# Patient Record
Sex: MALE | Race: WHITE | NOT HISPANIC OR LATINO | Employment: FULL TIME | URBAN - METROPOLITAN AREA
[De-identification: names, ages, dates, MRNs, and addresses within clinical notes are randomized per-mention and may not be internally consistent; named-entity substitution may affect disease eponyms.]

---

## 2018-09-12 ENCOUNTER — OFFICE VISIT CONVERTED (OUTPATIENT)
Dept: ORTHOPEDIC SURGERY | Facility: CLINIC | Age: 53
End: 2018-09-12
Attending: ORTHOPAEDIC SURGERY

## 2018-10-10 ENCOUNTER — OFFICE VISIT CONVERTED (OUTPATIENT)
Dept: ORTHOPEDIC SURGERY | Facility: CLINIC | Age: 53
End: 2018-10-10
Attending: ORTHOPAEDIC SURGERY

## 2019-02-01 ENCOUNTER — HOSPITAL ENCOUNTER (OUTPATIENT)
Dept: LAB | Facility: HOSPITAL | Age: 54
Discharge: HOME | End: 2019-02-01
Attending: PHYSICIAN ASSISTANT

## 2019-02-01 LAB
C TRACH RRNA CVX QL NAA+PROBE: NOT DETECTED
CONV HIV-1/ HIV-2: NEGATIVE
N GONORRHOEA DNA SPEC QL NAA+PROBE: NOT DETECTED

## 2019-02-02 LAB
CONV HERPES TYPE II IGG SUPPLEMENTAL CONFIRMATION: POSITIVE
HSV2 IGG SER IA-ACNC: 1.23 INDEX (ref 0–0.9)

## 2019-02-04 LAB
CONV HEPATITIS B SURFACE AG W CONFIRMATION RE: NEGATIVE
CONV HEPATITIS COMMENT: NORMAL
CONV TREPONEMA PALLIDUM (RPR) WITH FTA-ABS, TP-PA REFLEXES: NON REACTIVE
HBV CORE AB SER DONR QL IA: NEGATIVE
HBV CORE IGM SERPL QL IA: NEGATIVE
HBV E AB SERPL QL IA: NEGATIVE
HBV E AG SERPL QL IA: NEGATIVE
HBV SURFACE AB SER QL: NON REACTIVE
HCV AB S/CO SERPL IA: <0.1 S/CO RATIO (ref 0–0.9)
HSV I/II IGM: <0.91 RATIO (ref 0–0.9)

## 2019-02-13 ENCOUNTER — HOSPITAL ENCOUNTER (OUTPATIENT)
Dept: LAB | Facility: HOSPITAL | Age: 54
Discharge: HOME | End: 2019-02-13
Attending: PHYSICIAN ASSISTANT

## 2019-02-13 LAB
25(OH)D3 SERPL-MCNC: 26.1 NG/ML (ref 30–100)
ALBUMIN SERPL-MCNC: 4.5 G/DL (ref 3.5–5)
ALBUMIN/GLOB SERPL: 1.8 {RATIO} (ref 1.4–2.6)
ALP SERPL-CCNC: 46 U/L (ref 56–119)
ALT SERPL-CCNC: 38 U/L (ref 10–40)
ANION GAP SERPL CALC-SCNC: 19 MMOL/L (ref 8–19)
AST SERPL-CCNC: 19 U/L (ref 15–50)
BASOPHILS # BLD AUTO: 0.06 10*3/UL (ref 0–0.2)
BASOPHILS NFR BLD AUTO: 0.57 % (ref 0–3)
BILIRUB SERPL-MCNC: 0.27 MG/DL (ref 0.2–1.3)
BUN SERPL-MCNC: 18 MG/DL (ref 5–25)
BUN/CREAT SERPL: 13 {RATIO} (ref 6–20)
CALCIUM SERPL-MCNC: 9.5 MG/DL (ref 8.7–10.4)
CHLORIDE SERPL-SCNC: 101 MMOL/L (ref 99–111)
CHOLEST SERPL-MCNC: 158 MG/DL (ref 107–200)
CHOLEST/HDLC SERPL: 4.2 {RATIO} (ref 3–6)
CONV CO2: 25 MMOL/L (ref 22–32)
CONV TOTAL PROTEIN: 7 G/DL (ref 6.3–8.2)
CREAT UR-MCNC: 1.34 MG/DL (ref 0.7–1.2)
EOSINOPHIL # BLD AUTO: 0.09 10*3/UL (ref 0–0.7)
EOSINOPHIL # BLD AUTO: 0.86 % (ref 0–7)
ERYTHROCYTE [DISTWIDTH] IN BLOOD BY AUTOMATED COUNT: 12.4 % (ref 11.5–14.5)
GFR SERPLBLD BASED ON 1.73 SQ M-ARVRAT: >60 ML/MIN/{1.73_M2}
GLOBULIN UR ELPH-MCNC: 2.5 G/DL (ref 2–3.5)
GLUCOSE SERPL-MCNC: 89 MG/DL (ref 70–99)
HBA1C MFR BLD: 15.4 G/DL (ref 14–18)
HCT VFR BLD AUTO: 44.9 % (ref 42–52)
HDLC SERPL-MCNC: 38 MG/DL (ref 40–60)
LDLC SERPL CALC-MCNC: 69 MG/DL (ref 70–100)
LYMPHOCYTES # BLD AUTO: 1.98 10*3/UL (ref 1–5)
MCH RBC QN AUTO: 28.9 PG (ref 27–31)
MCHC RBC AUTO-ENTMCNC: 34.2 G/DL (ref 33–37)
MCV RBC AUTO: 84.4 FL (ref 80–96)
MONOCYTES # BLD AUTO: 0.68 10*3/UL (ref 0.2–1.2)
MONOCYTES NFR BLD AUTO: 6.88 % (ref 3–10)
NEUTROPHILS # BLD AUTO: 7.14 10*3/UL (ref 2–8)
NEUTROPHILS NFR BLD AUTO: 71.8 % (ref 30–85)
NRBC BLD AUTO-RTO: 0 % (ref 0–0.01)
OSMOLALITY SERPL CALC.SUM OF ELEC: 291 MOSM/KG (ref 273–304)
PLATELET # BLD AUTO: 264 10*3/UL (ref 130–400)
PMV BLD AUTO: 8.7 FL (ref 7.4–10.4)
POTASSIUM SERPL-SCNC: 4.5 MMOL/L (ref 3.5–5.3)
RBC # BLD AUTO: 5.32 10*6/UL (ref 4.7–6.1)
SODIUM SERPL-SCNC: 140 MMOL/L (ref 135–147)
T4 FREE SERPL-MCNC: 1.3 NG/DL (ref 0.9–1.8)
TRIGL SERPL-MCNC: 254 MG/DL (ref 40–150)
TSH SERPL-ACNC: 1.67 M[IU]/L (ref 0.27–4.2)
VARIANT LYMPHS NFR BLD MANUAL: 19.9 % (ref 20–45)
VLDLC SERPL-MCNC: 51 MG/DL (ref 5–37)
WBC # BLD AUTO: 9.95 10*3/UL (ref 4.8–10.8)

## 2019-02-14 ENCOUNTER — TELEPHONE (OUTPATIENT)
Dept: INFECTIOUS DISEASES | Facility: CLINIC | Age: 54
End: 2019-02-14

## 2019-03-12 ENCOUNTER — OFFICE VISIT (OUTPATIENT)
Dept: INFECTIOUS DISEASES | Facility: CLINIC | Age: 54
End: 2019-03-12

## 2019-03-12 VITALS
HEIGHT: 76 IN | WEIGHT: 268 LBS | HEART RATE: 79 BPM | TEMPERATURE: 97.8 F | DIASTOLIC BLOOD PRESSURE: 93 MMHG | SYSTOLIC BLOOD PRESSURE: 130 MMHG | RESPIRATION RATE: 12 BRPM | BODY MASS INDEX: 32.63 KG/M2

## 2019-03-12 DIAGNOSIS — R76.8 HSV-2 SEROPOSITIVE: Primary | ICD-10-CM

## 2019-03-12 PROCEDURE — 99244 OFF/OP CNSLTJ NEW/EST MOD 40: CPT | Performed by: INTERNAL MEDICINE

## 2019-03-12 RX ORDER — VALACYCLOVIR HYDROCHLORIDE 500 MG/1
500 TABLET, FILM COATED ORAL DAILY
Qty: 30 TABLET | Refills: 5 | Status: SHIPPED | OUTPATIENT
Start: 2019-03-12 | End: 2019-12-12 | Stop reason: SDUPTHER

## 2019-03-12 NOTE — PROGRESS NOTES
"cc: New patient HSV infection    This very nice 53-year-old gentleman we are asked for evaluation opinion regarding HSV 2 serologies.    Per the patient, he reports that he was tested for a full gambit of sexually transmitted infections at his request after recent partner tested positive for HPV.  He reports that he had been  for about 17 years but lost his wife to progressive supranuclear palsy about a year and a half ago.  He has been with 2 partners since that time.  He denies any active symptoms of sexually transmitted infection such as genital ulcers or dysuria.  Has had some recurrent fever blisters over the past year and a half.  His wife did have extramarital affairs which he theorizes may have been due to impulsivity from the neurologic disease she had.  In any event, he is very concerned about potentially spreading infections to new partners.    Past medical history: Hypertension  No known drug allergies no family history of infection    Social history: He lives in [place], [place].  Originally from [place]. Works as horse farrier and as .    Review of Systems: All other reviewed and negative except as per HPI    Blood pressure 130/93, pulse 79, temperature 97.8 °F (36.6 °C), resp. rate 12, height 193 cm (76\"), weight 122 kg (268 lb).  GENERAL: Awake and alert, in no acute distress.   HEENT:  Hearing is grossly normal.   EYES:  No conjunctival injection. No lid lag.   LUNGS: normal respiratory effort.   SKIN: Warm and dry without cutaneous eruptions   PSYCHIATRIC: Appropriate mood, affect, insight, and judgment.       DIAGNOSTICS:  HSV-2 serologies were positive at his primary providers office    Assessment and Plan  1.  HSV 2+ serology:    He is asymptomatic and has never had any genital ulcers to suggest HSV general infection.  That said there are some asymptomatic cases described.  It may be that it is falsely seropositive since the HSV serologies are very poor " test and have a lot of false positives and poor positive predictive value.  He is just exceedingly concerned about transmitting infections to new partners.  We discussed natural history of HSV and HPV.  Different therapeutic options were considered and entertained.    I think we settled on having some valacyclovir available either for suppression or treatment should he develop new ulcers.  If he develops a new fever blister of encouraged him to call the clinic where we can screen it either by DFA or culture to see if he grows HSV 1 or 2 which might help allay some anxieties.  I have gone ahead and prescribed him several months of valacyclovir.    We discussed measures to prevent sexually transmitted infections including reliable use of barrier protection and if needed abstinence.    I will see him back in clinic in 6 months or sooner if necessary    Total time of encounter was greater than 60 minutes of which over 50% was spent counseling the patient on the above topics.

## 2019-07-01 ENCOUNTER — OFFICE VISIT (OUTPATIENT)
Dept: INFECTIOUS DISEASES | Facility: CLINIC | Age: 54
End: 2019-07-01

## 2019-07-01 ENCOUNTER — APPOINTMENT (OUTPATIENT)
Dept: LAB | Facility: HOSPITAL | Age: 54
End: 2019-07-01

## 2019-07-01 ENCOUNTER — TELEPHONE (OUTPATIENT)
Dept: INFECTIOUS DISEASES | Facility: CLINIC | Age: 54
End: 2019-07-01

## 2019-07-01 DIAGNOSIS — R76.8 HSV-2 SEROPOSITIVE: Primary | ICD-10-CM

## 2019-07-01 DIAGNOSIS — Z79.2 LONG TERM CURRENT USE OF ANTIBIOTICS: ICD-10-CM

## 2019-07-01 DIAGNOSIS — B00.2 HERPES GINGIVOSTOMATITIS: ICD-10-CM

## 2019-07-01 PROCEDURE — 99213 OFFICE O/P EST LOW 20 MIN: CPT | Performed by: INTERNAL MEDICINE

## 2019-07-01 PROCEDURE — 87255 GENET VIRUS ISOLATE HSV: CPT | Performed by: INTERNAL MEDICINE

## 2019-07-01 NOTE — TELEPHONE ENCOUNTER
Pt states that he was instructed by Dr. Garcia that the next time he had outbreak of fever blisters on his lip to come to the lab to have a swab.  Patient states that he currently is having an outbreak and is on his way to the lab.  I informed him that I would make Dr. Garcia aware so that an order can be placed in Epic

## 2019-07-01 NOTE — PROGRESS NOTES
cc: f/u HSV?    Patient was evaluated for potential HSV in March 2019.  He was concerned about passing this on the new sexual partner so we put him on prophylactic and suppressive valacyclovir.  He is tolerated this without side effects or missed doses.  5 days ago he noted 3 separate lesions of blistering on the lower lip.  This occurred in the context of being on the son in [place], [place].  His whole face actually blistered.  He said some crusting from the lesions which is improved with an increased dose of the valacyclovir.  He is interested to see if this might be HSV-1 versus to do so came to the clinic today    Past medical history: Hypertension  No known drug allergies no family history of infection    Social history: He lives in [place], [place].  Originally [place]. Works as horse farrier and as .      GENERAL: Awake and alert, in no acute distress.   HEENT:  Hearing is grossly normal.   EYES:  No conjunctival injection. No lid lag.   LUNGS: normal respiratory effort.   SKIN: Warm and dry without cutaneous eruptions x on lip where he has three lesion   PSYCHIATRIC: Appropriate mood, affect, insight, and judgment.       DIAGNOSTICS:  HSV-2 serologies were positive at his primary providers office    Assessment and Plan  1.  HSV 2+ serology/herpes gingivostomatitis  2.  Long-term current use antibiotics  I unroofed a lesion on the lip and this was mainly bloody.  No real purulent fluid.  I suspect that that these are healing HSV outbreak and the cultures will be negative but we will go ahead and send down for culture just to verify.  Otherwise we will keep on valacyclovir

## 2019-07-03 LAB — HSV SPEC CULT: NEGATIVE

## 2019-07-09 ENCOUNTER — TELEPHONE (OUTPATIENT)
Dept: INFECTIOUS DISEASES | Facility: CLINIC | Age: 54
End: 2019-07-09

## 2019-07-09 NOTE — TELEPHONE ENCOUNTER
After several attempts and voicemails left for patient with no response, I finally left him a voicemail stating test results from Dr. Garcia were negative and he could call our office for specific details if needed. Ninoska Pichardo RN

## 2019-07-09 NOTE — TELEPHONE ENCOUNTER
----- Message from Chin Garcia MD sent at 7/4/2019  6:03 AM EDT -----  Can we let him know his swab culture was negative?

## 2019-11-04 ENCOUNTER — OFFICE VISIT (OUTPATIENT)
Dept: INFECTIOUS DISEASES | Facility: CLINIC | Age: 54
End: 2019-11-04

## 2019-11-04 VITALS
BODY MASS INDEX: 31.9 KG/M2 | SYSTOLIC BLOOD PRESSURE: 130 MMHG | WEIGHT: 262 LBS | HEART RATE: 113 BPM | TEMPERATURE: 98 F | HEIGHT: 76 IN | OXYGEN SATURATION: 98 % | DIASTOLIC BLOOD PRESSURE: 90 MMHG

## 2019-11-04 DIAGNOSIS — B00.2 HERPES GINGIVOSTOMATITIS: Primary | ICD-10-CM

## 2019-11-04 DIAGNOSIS — Z79.2 LONG TERM CURRENT USE OF ANTIBIOTICS: ICD-10-CM

## 2019-11-04 PROCEDURE — 99213 OFFICE O/P EST LOW 20 MIN: CPT | Performed by: INTERNAL MEDICINE

## 2019-11-04 NOTE — PROGRESS NOTES
cc: f/u HSV?    Patient was evaluated for potential HSV in March 2019.  He was concerned about passing this on the new sexual partner so we put him on prophylactic and suppressive valacyclovir.  He came in for possible outbreak in July 2019 but was already several days old and culture was negative.  He reports that he may be had one more outbreak since that time which responded to twice daily dosing of the Valtrex.  He takes the long-term use of Valtrex about 90% of the time.  Otherwise he is having some nocturia and is due for colonoscopy so is going to follow-up with his primary care provider    Past medical history: Hypertension  No known drug allergies no family history of infection    Social history: He lives in [place], [place].  Originally from [place]. Works as horse farrier and as .    Vitals:    11/04/19 1334   BP: 130/90   Pulse: 113   Temp: 98 °F (36.7 °C)   SpO2: 98%       GENERAL: Awake and alert, in no acute distress.   HEENT:  Hearing is grossly normal.   EYES:  No conjunctival injection. No lid lag.   LUNGS: normal respiratory effort.   SKIN: Warm and dry without cutaneous eruptions x on lip where he has three lesion   PSYCHIATRIC: Appropriate mood, affect, insight, and judgment.       DIAGNOSTICS:  HSV-2 serologies were positive at his primary providers office  7/1/19  HSV cx: negative  Assessment and Plan  1.  HSV 2+ serology/herpes gingivostomatitis  2.  Long-term current use antibiotics    Seems to be tolerating Valtrex very nicely.  We will continue.  Give at a dose of 500 daily.  Return to clinic in 6 months or sooner if needed

## 2019-11-07 ENCOUNTER — HOSPITAL ENCOUNTER (OUTPATIENT)
Dept: LAB | Facility: HOSPITAL | Age: 54
Discharge: HOME | End: 2019-11-07
Attending: PHYSICIAN ASSISTANT

## 2019-11-07 LAB
ALBUMIN SERPL-MCNC: 4.5 G/DL (ref 3.5–5)
ALBUMIN/GLOB SERPL: 1.7 {RATIO} (ref 1.4–2.6)
ALP SERPL-CCNC: 49 U/L (ref 56–119)
ALT SERPL-CCNC: 41 U/L (ref 10–40)
ANION GAP SERPL CALC-SCNC: 23 MMOL/L (ref 8–19)
AST SERPL-CCNC: 18 U/L (ref 15–50)
BASOPHILS # BLD AUTO: 0.07 10*3/UL (ref 0–0.2)
BASOPHILS NFR BLD AUTO: 0.8 % (ref 0–3)
BILIRUB SERPL-MCNC: 0.38 MG/DL (ref 0.2–1.3)
BUN SERPL-MCNC: 20 MG/DL (ref 5–25)
BUN/CREAT SERPL: 16 {RATIO} (ref 6–20)
CALCIUM SERPL-MCNC: 9.7 MG/DL (ref 8.7–10.4)
CHLORIDE SERPL-SCNC: 99 MMOL/L (ref 99–111)
CONV ABS IMM GRAN: 0.03 10*3/UL (ref 0–0.2)
CONV CO2: 24 MMOL/L (ref 22–32)
CONV IMMATURE GRAN: 0.4 % (ref 0–1.8)
CONV TOTAL PROTEIN: 7.1 G/DL (ref 6.3–8.2)
CREAT UR-MCNC: 1.24 MG/DL (ref 0.7–1.2)
DEPRECATED RDW RBC AUTO: 39.8 FL (ref 35.1–43.9)
EOSINOPHIL # BLD AUTO: 0.1 10*3/UL (ref 0–0.7)
EOSINOPHIL # BLD AUTO: 1.2 % (ref 0–7)
ERYTHROCYTE [DISTWIDTH] IN BLOOD BY AUTOMATED COUNT: 12.8 % (ref 11.6–14.4)
GFR SERPLBLD BASED ON 1.73 SQ M-ARVRAT: >60 ML/MIN/{1.73_M2}
GLOBULIN UR ELPH-MCNC: 2.6 G/DL (ref 2–3.5)
GLUCOSE SERPL-MCNC: 106 MG/DL (ref 70–99)
HCT VFR BLD AUTO: 44.1 % (ref 42–52)
HGB BLD-MCNC: 14.6 G/DL (ref 14–18)
LYMPHOCYTES # BLD AUTO: 1.82 10*3/UL (ref 1–5)
LYMPHOCYTES NFR BLD AUTO: 21.6 % (ref 20–45)
MCH RBC QN AUTO: 28.4 PG (ref 27–31)
MCHC RBC AUTO-ENTMCNC: 33.1 G/DL (ref 33–37)
MCV RBC AUTO: 85.8 FL (ref 80–96)
MONOCYTES # BLD AUTO: 0.74 10*3/UL (ref 0.2–1.2)
MONOCYTES NFR BLD AUTO: 8.8 % (ref 3–10)
NEUTROPHILS # BLD AUTO: 5.66 10*3/UL (ref 2–8)
NEUTROPHILS NFR BLD AUTO: 67.2 % (ref 30–85)
NRBC CBCN: 0 % (ref 0–0.7)
OSMOLALITY SERPL CALC.SUM OF ELEC: 297 MOSM/KG (ref 273–304)
PLATELET # BLD AUTO: 275 10*3/UL (ref 130–400)
PMV BLD AUTO: 11 FL (ref 9.4–12.4)
POTASSIUM SERPL-SCNC: 4.2 MMOL/L (ref 3.5–5.3)
PSA SERPL-MCNC: 3.07 NG/ML (ref 0–4)
RBC # BLD AUTO: 5.14 10*6/UL (ref 4.7–6.1)
SODIUM SERPL-SCNC: 142 MMOL/L (ref 135–147)
WBC # BLD AUTO: 8.42 10*3/UL (ref 4.8–10.8)

## 2019-11-12 ENCOUNTER — HOSPITAL ENCOUNTER (OUTPATIENT)
Dept: LAB | Facility: HOSPITAL | Age: 54
Discharge: HOME | End: 2019-11-12
Attending: PHYSICIAN ASSISTANT

## 2019-11-12 LAB
APPEARANCE UR: ABNORMAL
BILIRUB UR QL: NEGATIVE
COLOR UR: YELLOW
CONV BACTERIA: NEGATIVE
CONV COLLECTION SOURCE (UA): ABNORMAL
CONV UROBILINOGEN IN URINE BY AUTOMATED TEST STRIP: 0.2 {EHRLICHU}/DL (ref 0.1–1)
GLUCOSE UR QL: NEGATIVE MG/DL
HGB UR QL STRIP: NEGATIVE
KETONES UR QL STRIP: NEGATIVE MG/DL
LEUKOCYTE ESTERASE UR QL STRIP: NEGATIVE
NITRITE UR QL STRIP: NEGATIVE
PH UR STRIP.AUTO: 5 [PH] (ref 5–8)
PROT UR QL: NEGATIVE MG/DL
RBC #/AREA URNS HPF: ABNORMAL /[HPF]
SP GR UR: 1.02 (ref 1–1.03)
WBC #/AREA URNS HPF: ABNORMAL /[HPF]

## 2019-11-14 LAB — BACTERIA UR CULT: NORMAL

## 2019-12-04 ENCOUNTER — OFFICE VISIT CONVERTED (OUTPATIENT)
Dept: SURGERY | Facility: CLINIC | Age: 54
End: 2019-12-04
Attending: SURGERY

## 2019-12-13 RX ORDER — VALACYCLOVIR HYDROCHLORIDE 500 MG/1
500 TABLET, FILM COATED ORAL DAILY
Qty: 30 TABLET | Refills: 5 | Status: SHIPPED | OUTPATIENT
Start: 2019-12-13 | End: 2020-01-12

## 2019-12-17 ENCOUNTER — HOSPITAL ENCOUNTER (OUTPATIENT)
Dept: GASTROENTEROLOGY | Facility: HOSPITAL | Age: 54
Setting detail: HOSPITAL OUTPATIENT SURGERY
Discharge: HOME | End: 2019-12-17
Attending: SURGERY

## 2020-12-04 ENCOUNTER — HOSPITAL ENCOUNTER (EMERGENCY)
Facility: HOSPITAL | Age: 55
Discharge: HOME | End: 2020-12-04
Attending: EMERGENCY MEDICINE | Admitting: EMERGENCY MEDICINE

## 2020-12-04 VITALS
WEIGHT: 270 LBS | TEMPERATURE: 97.7 F | HEART RATE: 78 BPM | BODY MASS INDEX: 32.88 KG/M2 | RESPIRATION RATE: 16 BRPM | SYSTOLIC BLOOD PRESSURE: 133 MMHG | DIASTOLIC BLOOD PRESSURE: 90 MMHG | HEIGHT: 76 IN | OXYGEN SATURATION: 94 %

## 2020-12-04 DIAGNOSIS — M25.572 ACUTE LEFT ANKLE PAIN: Primary | ICD-10-CM

## 2020-12-04 DIAGNOSIS — R73.09 ELEVATED GLUCOSE: ICD-10-CM

## 2020-12-04 DIAGNOSIS — M10.00 ACUTE IDIOPATHIC GOUT, UNSPECIFIED SITE: ICD-10-CM

## 2020-12-04 DIAGNOSIS — E79.0 ELEVATED BLOOD URIC ACID LEVEL: ICD-10-CM

## 2020-12-04 LAB
ALBUMIN SERPL-MCNC: 4 G/DL (ref 3.5–5.2)
ALBUMIN/GLOB SERPL: 1.4 G/DL
ALP SERPL-CCNC: 55 U/L (ref 39–117)
ALT SERPL W P-5'-P-CCNC: 29 U/L (ref 1–41)
ANION GAP SERPL CALCULATED.3IONS-SCNC: 8 MMOL/L (ref 5–15)
AST SERPL-CCNC: 15 U/L (ref 1–40)
BASOPHILS # BLD AUTO: 0.05 10*3/MM3 (ref 0–0.2)
BASOPHILS NFR BLD AUTO: 0.5 % (ref 0–1.5)
BILIRUB SERPL-MCNC: 0.3 MG/DL (ref 0–1.2)
BUN SERPL-MCNC: 19 MG/DL (ref 6–20)
BUN/CREAT SERPL: 15.7 (ref 7–25)
CALCIUM SPEC-SCNC: 9.6 MG/DL (ref 8.6–10.5)
CHLORIDE SERPL-SCNC: 104 MMOL/L (ref 98–107)
CO2 SERPL-SCNC: 27 MMOL/L (ref 22–29)
CREAT SERPL-MCNC: 1.21 MG/DL (ref 0.76–1.27)
DEPRECATED RDW RBC AUTO: 39.7 FL (ref 37–54)
EOSINOPHIL # BLD AUTO: 0.15 10*3/MM3 (ref 0–0.4)
EOSINOPHIL NFR BLD AUTO: 1.5 % (ref 0.3–6.2)
ERYTHROCYTE [DISTWIDTH] IN BLOOD BY AUTOMATED COUNT: 12.9 % (ref 12.3–15.4)
ERYTHROCYTE [SEDIMENTATION RATE] IN BLOOD: 16 MM/HR (ref 0–20)
GFR SERPL CREATININE-BSD FRML MDRD: 63 ML/MIN/1.73
GLOBULIN UR ELPH-MCNC: 2.8 GM/DL
GLUCOSE SERPL-MCNC: 142 MG/DL (ref 65–99)
HCT VFR BLD AUTO: 43.1 % (ref 37.5–51)
HGB BLD-MCNC: 13.8 G/DL (ref 13–17.7)
IMM GRANULOCYTES # BLD AUTO: 0.04 10*3/MM3 (ref 0–0.05)
IMM GRANULOCYTES NFR BLD AUTO: 0.4 % (ref 0–0.5)
LYMPHOCYTES # BLD AUTO: 1.63 10*3/MM3 (ref 0.7–3.1)
LYMPHOCYTES NFR BLD AUTO: 15.9 % (ref 19.6–45.3)
MCH RBC QN AUTO: 27.3 PG (ref 26.6–33)
MCHC RBC AUTO-ENTMCNC: 32 G/DL (ref 31.5–35.7)
MCV RBC AUTO: 85.3 FL (ref 79–97)
MONOCYTES # BLD AUTO: 0.83 10*3/MM3 (ref 0.1–0.9)
MONOCYTES NFR BLD AUTO: 8.1 % (ref 5–12)
NEUTROPHILS NFR BLD AUTO: 7.57 10*3/MM3 (ref 1.7–7)
NEUTROPHILS NFR BLD AUTO: 73.6 % (ref 42.7–76)
NRBC BLD AUTO-RTO: 0 /100 WBC (ref 0–0.2)
PLATELET # BLD AUTO: 340 10*3/MM3 (ref 140–450)
PMV BLD AUTO: 9.9 FL (ref 6–12)
POTASSIUM SERPL-SCNC: 4.3 MMOL/L (ref 3.5–5.2)
PROT SERPL-MCNC: 6.8 G/DL (ref 6–8.5)
RBC # BLD AUTO: 5.05 10*6/MM3 (ref 4.14–5.8)
SODIUM SERPL-SCNC: 139 MMOL/L (ref 136–145)
URATE SERPL-MCNC: 9.6 MG/DL (ref 3.4–7)
WBC # BLD AUTO: 10.27 10*3/MM3 (ref 3.4–10.8)

## 2020-12-04 PROCEDURE — 84550 ASSAY OF BLOOD/URIC ACID: CPT | Performed by: EMERGENCY MEDICINE

## 2020-12-04 PROCEDURE — 99283 EMERGENCY DEPT VISIT LOW MDM: CPT

## 2020-12-04 PROCEDURE — 85025 COMPLETE CBC W/AUTO DIFF WBC: CPT | Performed by: EMERGENCY MEDICINE

## 2020-12-04 PROCEDURE — 96375 TX/PRO/DX INJ NEW DRUG ADDON: CPT

## 2020-12-04 PROCEDURE — 80053 COMPREHEN METABOLIC PANEL: CPT | Performed by: EMERGENCY MEDICINE

## 2020-12-04 PROCEDURE — 96374 THER/PROPH/DIAG INJ IV PUSH: CPT

## 2020-12-04 PROCEDURE — 85652 RBC SED RATE AUTOMATED: CPT | Performed by: EMERGENCY MEDICINE

## 2020-12-04 PROCEDURE — 25010000002 DEXAMETHASONE PER 1 MG: Performed by: EMERGENCY MEDICINE

## 2020-12-04 PROCEDURE — 25010000002 KETOROLAC TROMETHAMINE PER 15 MG: Performed by: EMERGENCY MEDICINE

## 2020-12-04 RX ORDER — DEXAMETHASONE SODIUM PHOSPHATE 4 MG/ML
8 INJECTION, SOLUTION INTRA-ARTICULAR; INTRALESIONAL; INTRAMUSCULAR; INTRAVENOUS; SOFT TISSUE ONCE
Status: COMPLETED | OUTPATIENT
Start: 2020-12-04 | End: 2020-12-04

## 2020-12-04 RX ORDER — KETOROLAC TROMETHAMINE 15 MG/ML
15 INJECTION, SOLUTION INTRAMUSCULAR; INTRAVENOUS ONCE
Status: COMPLETED | OUTPATIENT
Start: 2020-12-04 | End: 2020-12-04

## 2020-12-04 RX ORDER — PREDNISONE 20 MG/1
20 TABLET ORAL 2 TIMES DAILY
Qty: 6 TABLET | Refills: 0 | Status: SHIPPED | OUTPATIENT
Start: 2020-12-04 | End: 2020-12-07

## 2020-12-04 RX ORDER — SODIUM CHLORIDE 0.9 % (FLUSH) 0.9 %
10 SYRINGE (ML) INJECTION AS NEEDED
Status: DISCONTINUED | OUTPATIENT
Start: 2020-12-04 | End: 2020-12-04 | Stop reason: HOSPADM

## 2020-12-04 RX ORDER — COLCHICINE 0.6 MG/1
0.6 TABLET ORAL DAILY
Status: DISCONTINUED | OUTPATIENT
Start: 2020-12-04 | End: 2020-12-04 | Stop reason: HOSPADM

## 2020-12-04 RX ORDER — COLCHICINE 0.6 MG/1
0.6 TABLET ORAL DAILY
Qty: 30 TABLET | Refills: 0 | Status: SHIPPED | OUTPATIENT
Start: 2020-12-04 | End: 2021-07-30 | Stop reason: SDUPTHER

## 2020-12-04 RX ADMIN — KETOROLAC TROMETHAMINE 15 MG: 15 INJECTION, SOLUTION INTRAMUSCULAR; INTRAVENOUS at 08:06

## 2020-12-04 RX ADMIN — DEXAMETHASONE SODIUM PHOSPHATE 8 MG: 4 INJECTION, SOLUTION INTRAMUSCULAR; INTRAVENOUS at 08:07

## 2020-12-04 RX ADMIN — COLCHICINE 0.6 MG: 0.6 TABLET, FILM COATED ORAL at 09:34

## 2020-12-04 NOTE — ED PROVIDER NOTES
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  He really does not have a primary care doctor at this point.  Presents to the ER today with what he believes is gout in his left ankle.    This gentleman has a strong family history of gout believes he started having gout about 4 years ago in his head it both his ankles and his knees and his right elbow.  A year or 2 ago he was treated at [facility] and was started on colchicine and allopurinol but then still had exacerbations and is never really followed up.    His current episode has been going on for a several days and up to 2 weeks and his left ankle is as miserable.  He has been taking over-the-counter Aleve without much relief of his symptoms.    He is never seen a rheumatologist or had a 24-hour urine done.    He denies any injury to his left ankle.  He is generally vigorously active.  He takes no regular medications.  He has been trying to modify his diet and take a variety of supplements to help prevent the gout.  He has had no bleeding per any orifice.  He is not a beer drinker.  He also reports a rash that is been present on his legs for many weeks.  He is never seen anybody for it though rash is not pruritic.        All other systems reviewed and are negative except as noted above.          Review of Systems   All other systems reviewed and are negative.      Past Medical History:   Diagnosis Date   • COPD (chronic obstructive pulmonary disease) (CMS/[facility])    • Gout        No Known Allergies    Past Surgical History:   Procedure Laterality Date   • COLONOSCOPY  12/05/2019   • LIPOMA EXCISION  2010    removed from back       History reviewed. No pertinent family history.    Social History     Socioeconomic History   • Marital status:      Spouse name: Not on file   • Number of children: Not on file   • Years of education: Not on file   • Highest education level: Not on file   Tobacco  Use   • Smoking status: Never Smoker   Substance and Sexual Activity   • Alcohol use: Never     Frequency: Never   • Drug use: Never   • Sexual activity: Defer           Objective   Physical Exam  Vitals signs and nursing note reviewed.   Constitutional:       Appearance: Normal appearance.      Comments: This is a middle-aged man in no acute distress he is alert and oriented.  He is modestly obese.   HENT:      Head: Normocephalic and atraumatic.      Right Ear: External ear normal.      Left Ear: External ear normal.      Nose: Nose normal.      Mouth/Throat:      Mouth: Mucous membranes are moist.      Pharynx: Oropharynx is clear.   Eyes:      Extraocular Movements: Extraocular movements intact.      Conjunctiva/sclera: Conjunctivae normal.      Pupils: Pupils are equal, round, and reactive to light.   Neck:      Musculoskeletal: Normal range of motion and neck supple.   Cardiovascular:      Rate and Rhythm: Normal rate and regular rhythm.      Pulses: Normal pulses.      Heart sounds: Normal heart sounds.   Pulmonary:      Effort: Pulmonary effort is normal.      Breath sounds: Normal breath sounds.   Abdominal:      General: Bowel sounds are normal. There is no distension.      Palpations: Abdomen is soft. There is no mass.      Tenderness: There is no abdominal tenderness.   Musculoskeletal:      Comments: His hand shows some osteoarthritic changes but he has no rash or evidence of gout there in his upper extremities.  His lower extremities are knees in good shape his left ankle is moderately swollen especially on the lateral aspect a little bit red and tender with slightly decreased range of movement but is not particularly hot.  Is good pulses in his feet bilaterally.  His right ankle is unremarkable.  He does have an interesting almost morbilliform but not reddened rash that extends from his knees to his ankles bilaterally.  Almost looks like molluscum contagiosum that would be a very unusual pattern.  It  spares the soles of his feet.   Lymphadenopathy:      Cervical: No cervical adenopathy.   Skin:     General: Skin is warm and dry.      Capillary Refill: Capillary refill takes less than 2 seconds.      Findings: Rash present.   Neurological:      General: No focal deficit present.      Mental Status: He is alert and oriented to person, place, and time.      Cranial Nerves: No cranial nerve deficit.      Sensory: No sensory deficit.   Psychiatric:         Mood and Affect: Mood normal.         Behavior: Behavior normal.         Thought Content: Thought content normal.         Judgment: Judgment normal.         Procedures           ED Course           Recent Results (from the past 24 hour(s))   Comprehensive Metabolic Panel    Collection Time: 12/04/20  7:41 AM    Specimen: Blood   Result Value Ref Range    Glucose 142 (H) 65 - 99 mg/dL    BUN 19 6 - 20 mg/dL    Creatinine 1.21 0.76 - 1.27 mg/dL    Sodium 139 136 - 145 mmol/L    Potassium 4.3 3.5 - 5.2 mmol/L    Chloride 104 98 - 107 mmol/L    CO2 27.0 22.0 - 29.0 mmol/L    Calcium 9.6 8.6 - 10.5 mg/dL    Total Protein 6.8 6.0 - 8.5 g/dL    Albumin 4.00 3.50 - 5.20 g/dL    ALT (SGPT) 29 1 - 41 U/L    AST (SGOT) 15 1 - 40 U/L    Alkaline Phosphatase 55 39 - 117 U/L    Total Bilirubin 0.3 0.0 - 1.2 mg/dL    eGFR Non African Amer 63 >60 mL/min/1.73    Globulin 2.8 gm/dL    A/G Ratio 1.4 g/dL    BUN/Creatinine Ratio 15.7 7.0 - 25.0    Anion Gap 8.0 5.0 - 15.0 mmol/L   Uric Acid    Collection Time: 12/04/20  7:41 AM    Specimen: Blood   Result Value Ref Range    Uric Acid 9.6 (H) 3.4 - 7.0 mg/dL   Sedimentation Rate    Collection Time: 12/04/20  7:41 AM    Specimen: Blood   Result Value Ref Range    Sed Rate 16 0 - 20 mm/hr   CBC Auto Differential    Collection Time: 12/04/20  7:41 AM    Specimen: Blood   Result Value Ref Range    WBC 10.27 3.40 - 10.80 10*3/mm3    RBC 5.05 4.14 - 5.80 10*6/mm3    Hemoglobin 13.8 13.0 - 17.7 g/dL    Hematocrit 43.1 37.5 - 51.0 %    MCV  85.3 79.0 - 97.0 fL    MCH 27.3 26.6 - 33.0 pg    MCHC 32.0 31.5 - 35.7 g/dL    RDW 12.9 12.3 - 15.4 %    RDW-SD 39.7 37.0 - 54.0 fl    MPV 9.9 6.0 - 12.0 fL    Platelets 340 140 - 450 10*3/mm3    Neutrophil % 73.6 42.7 - 76.0 %    Lymphocyte % 15.9 (L) 19.6 - 45.3 %    Monocyte % 8.1 5.0 - 12.0 %    Eosinophil % 1.5 0.3 - 6.2 %    Basophil % 0.5 0.0 - 1.5 %    Immature Grans % 0.4 0.0 - 0.5 %    Neutrophils, Absolute 7.57 (H) 1.70 - 7.00 10*3/mm3    Lymphocytes, Absolute 1.63 0.70 - 3.10 10*3/mm3    Monocytes, Absolute 0.83 0.10 - 0.90 10*3/mm3    Eosinophils, Absolute 0.15 0.00 - 0.40 10*3/mm3    Basophils, Absolute 0.05 0.00 - 0.20 10*3/mm3    Immature Grans, Absolute 0.04 0.00 - 0.05 10*3/mm3    nRBC 0.0 0.0 - 0.2 /100 WBC     Note: In addition to lab results from this visit, the labs listed above may include labs taken at another facility or during a different encounter within the last 24 hours. Please correlate lab times with ED admission and discharge times for further clarification of the services performed during this visit.    No orders to display     Vitals:    12/04/20 0741 12/04/20 0800 12/04/20 0801 12/04/20 0900   BP: 133/92 134/92  133/90   BP Location:       Patient Position:       Pulse:  79  78   Resp:  18  16   Temp:       TempSrc:       SpO2: 96% 96% 96% 94%   Weight:       Height:         Medications   sodium chloride 0.9 % flush 10 mL (has no administration in time range)   colchicine tablet 0.6 mg (0.6 mg Oral Given 12/4/20 0934)   dexamethasone (DECADRON) injection 8 mg (8 mg Intravenous Given 12/4/20 0807)   ketorolac (TORADOL) injection 15 mg (15 mg Intravenous Given 12/4/20 0806)     ECG/EMG Results (last 24 hours)     ** No results found for the last 24 hours. **        No orders to display                                     MDM  Number of Diagnoses or Management Options  Acute idiopathic gout, unspecified site:   Acute left ankle pain:   Elevated blood uric acid level:   Elevated  glucose:   Diagnosis management comments:       I reviewed all available studies at the bedside with the patient.  His labs are fairly bland save for an elevated uric acid slightly elevated glucose.    In ER he received IV steroids oral colchicine and IV Toradol and felt much better on recheck.    I think the patient has acute gouty arthritis.  He also has a chronic component of gout.  Discussed in detail about this.  I will put him on a short course of steroids I told him will probably cause the sugar to elevate a little bit of asked him to follow-up with his primary care doctor regarding that.  I will put him on a longer course of courses the needle continue his nonsteroidals we talked about diet Charo refer him to the rheumatology as I think he needs a thorough evaluation and perhaps prophylactic therapy for gout as well.    He will return to the ER if worse in any way.    All are agreeable with plan       Amount and/or Complexity of Data Reviewed  Clinical lab tests: reviewed        Final diagnoses:   Acute left ankle pain   Acute idiopathic gout, unspecified site   Elevated blood uric acid level   Elevated glucose            Brayan Aranda MD  12/04/20 3862

## 2020-12-30 ENCOUNTER — HOSPITAL ENCOUNTER (OUTPATIENT)
Dept: LAB | Facility: HOSPITAL | Age: 55
Discharge: HOME | End: 2020-12-30
Attending: PHYSICIAN ASSISTANT

## 2020-12-30 LAB
ALBUMIN SERPL-MCNC: 3.9 G/DL (ref 3.5–5)
ALBUMIN/GLOB SERPL: 1.1 {RATIO} (ref 1.4–2.6)
ALP SERPL-CCNC: 61 U/L (ref 56–119)
ALT SERPL-CCNC: 39 U/L (ref 10–40)
ANION GAP SERPL CALC-SCNC: 22 MMOL/L (ref 8–19)
AST SERPL-CCNC: 22 U/L (ref 15–50)
BASOPHILS # BLD AUTO: 0.02 10*3/UL (ref 0–0.2)
BASOPHILS NFR BLD AUTO: 0.2 % (ref 0–3)
BILIRUB SERPL-MCNC: 0.46 MG/DL (ref 0.2–1.3)
BUN SERPL-MCNC: 13 MG/DL (ref 5–25)
BUN/CREAT SERPL: 9 {RATIO} (ref 6–20)
CALCIUM SERPL-MCNC: 9.4 MG/DL (ref 8.7–10.4)
CHLORIDE SERPL-SCNC: 103 MMOL/L (ref 99–111)
CHOLEST SERPL-MCNC: 103 MG/DL (ref 107–200)
CHOLEST/HDLC SERPL: 3.1 {RATIO} (ref 3–6)
CONV ABS IMM GRAN: 0.08 10*3/UL (ref 0–0.2)
CONV CO2: 25 MMOL/L (ref 22–32)
CONV IMMATURE GRAN: 0.9 % (ref 0–1.8)
CONV TOTAL PROTEIN: 7.3 G/DL (ref 6.3–8.2)
CREAT UR-MCNC: 1.37 MG/DL (ref 0.7–1.2)
DEPRECATED RDW RBC AUTO: 39.8 FL (ref 35.1–43.9)
EOSINOPHIL # BLD AUTO: 0.09 10*3/UL (ref 0–0.7)
EOSINOPHIL # BLD AUTO: 1 % (ref 0–7)
ERYTHROCYTE [DISTWIDTH] IN BLOOD BY AUTOMATED COUNT: 12.8 % (ref 11.6–14.4)
GFR SERPLBLD BASED ON 1.73 SQ M-ARVRAT: 58 ML/MIN/{1.73_M2}
GLOBULIN UR ELPH-MCNC: 3.4 G/DL (ref 2–3.5)
GLUCOSE SERPL-MCNC: 93 MG/DL (ref 70–99)
HCT VFR BLD AUTO: 46.8 % (ref 42–52)
HDLC SERPL-MCNC: 33 MG/DL (ref 40–60)
HGB BLD-MCNC: 14.7 G/DL (ref 14–18)
LDLC SERPL CALC-MCNC: 56 MG/DL (ref 70–100)
LYMPHOCYTES # BLD AUTO: 1.43 10*3/UL (ref 1–5)
LYMPHOCYTES NFR BLD AUTO: 16.7 % (ref 20–45)
MCH RBC QN AUTO: 26.8 PG (ref 27–31)
MCHC RBC AUTO-ENTMCNC: 31.4 G/DL (ref 33–37)
MCV RBC AUTO: 85.4 FL (ref 80–96)
MONOCYTES # BLD AUTO: 0.76 10*3/UL (ref 0.2–1.2)
MONOCYTES NFR BLD AUTO: 8.9 % (ref 3–10)
NEUTROPHILS # BLD AUTO: 6.2 10*3/UL (ref 2–8)
NEUTROPHILS NFR BLD AUTO: 72.3 % (ref 30–85)
NRBC CBCN: 0 % (ref 0–0.7)
OSMOLALITY SERPL CALC.SUM OF ELEC: 300 MOSM/KG (ref 273–304)
PLATELET # BLD AUTO: 511 10*3/UL (ref 130–400)
PMV BLD AUTO: 10 FL (ref 9.4–12.4)
POTASSIUM SERPL-SCNC: 4.5 MMOL/L (ref 3.5–5.3)
PSA SERPL-MCNC: 3.39 NG/ML (ref 0–4)
RBC # BLD AUTO: 5.48 10*6/UL (ref 4.7–6.1)
SODIUM SERPL-SCNC: 145 MMOL/L (ref 135–147)
T4 FREE SERPL-MCNC: 1.3 NG/DL (ref 0.9–1.8)
TRIGL SERPL-MCNC: 68 MG/DL (ref 40–150)
TSH SERPL-ACNC: 1.6 M[IU]/L (ref 0.27–4.2)
URATE SERPL-MCNC: 9 MG/DL (ref 3.5–8.5)
VLDLC SERPL-MCNC: 14 MG/DL (ref 5–37)
WBC # BLD AUTO: 8.58 10*3/UL (ref 4.8–10.8)

## 2020-12-31 LAB — 25(OH)D3 SERPL-MCNC: 29 NG/ML (ref 30–100)

## 2021-01-15 RX ORDER — VALACYCLOVIR HYDROCHLORIDE 500 MG/1
TABLET, FILM COATED ORAL
Qty: 30 TABLET | Refills: 5 | OUTPATIENT
Start: 2021-01-15

## 2021-05-15 VITALS — RESPIRATION RATE: 14 BRPM | HEIGHT: 76 IN | WEIGHT: 279.12 LBS | BODY MASS INDEX: 33.99 KG/M2

## 2021-05-16 VITALS — HEART RATE: 52 BPM | WEIGHT: 265 LBS | BODY MASS INDEX: 32.27 KG/M2 | HEIGHT: 76 IN | OXYGEN SATURATION: 97 %

## 2021-05-16 VITALS — OXYGEN SATURATION: 98 % | HEIGHT: 76 IN | WEIGHT: 265 LBS | HEART RATE: 87 BPM | BODY MASS INDEX: 32.27 KG/M2

## 2021-07-21 ENCOUNTER — APPOINTMENT (OUTPATIENT)
Dept: GENERAL RADIOLOGY | Facility: HOSPITAL | Age: 56
End: 2021-07-21

## 2021-07-21 ENCOUNTER — HOSPITAL ENCOUNTER (EMERGENCY)
Facility: HOSPITAL | Age: 56
Discharge: HOME | End: 2021-07-21
Attending: EMERGENCY MEDICINE | Admitting: EMERGENCY MEDICINE

## 2021-07-21 VITALS
DIASTOLIC BLOOD PRESSURE: 95 MMHG | BODY MASS INDEX: 34.7 KG/M2 | HEART RATE: 117 BPM | RESPIRATION RATE: 19 BRPM | HEIGHT: 76 IN | TEMPERATURE: 98.3 F | SYSTOLIC BLOOD PRESSURE: 144 MMHG | WEIGHT: 285 LBS | OXYGEN SATURATION: 96 %

## 2021-07-21 DIAGNOSIS — S81.811A LACERATION OF RIGHT LOWER EXTREMITY, INITIAL ENCOUNTER: Primary | ICD-10-CM

## 2021-07-21 PROCEDURE — 73610 X-RAY EXAM OF ANKLE: CPT

## 2021-07-21 PROCEDURE — 99282 EMERGENCY DEPT VISIT SF MDM: CPT

## 2021-07-21 RX ORDER — LIDOCAINE HYDROCHLORIDE AND EPINEPHRINE 10; 10 MG/ML; UG/ML
20 INJECTION, SOLUTION INFILTRATION; PERINEURAL ONCE
Status: COMPLETED | OUTPATIENT
Start: 2021-07-21 | End: 2021-07-21

## 2021-07-21 RX ORDER — ALLOPURINOL 300 MG/1
300 TABLET ORAL DAILY
COMMUNITY
End: 2022-07-27 | Stop reason: SDUPTHER

## 2021-07-21 RX ORDER — TAMSULOSIN HYDROCHLORIDE 0.4 MG/1
1 CAPSULE ORAL DAILY
COMMUNITY
End: 2023-01-16 | Stop reason: SDUPTHER

## 2021-07-21 RX ORDER — GINSENG 100 MG
CAPSULE ORAL ONCE
Status: COMPLETED | OUTPATIENT
Start: 2021-07-21 | End: 2021-07-21

## 2021-07-21 RX ORDER — VALACYCLOVIR HYDROCHLORIDE 1 G/1
1000 TABLET, FILM COATED ORAL 2 TIMES DAILY PRN
COMMUNITY
End: 2021-07-30

## 2021-07-21 RX ADMIN — LIDOCAINE HYDROCHLORIDE,EPINEPHRINE BITARTRATE 8 ML: 10; .01 INJECTION, SOLUTION INFILTRATION; PERINEURAL at 17:12

## 2021-07-21 RX ADMIN — Medication 1 APPLICATION: at 17:53

## 2021-07-21 NOTE — ED PROVIDER NOTES
Subjective   Patient complaining of laceration to right anterior ankle.      History provided by:  Patient   used: No    Ankle Injury  Location:  Right anterior medial ankle  Quality:  Laceration  Severity:  Mild  Onset quality:  Sudden  Timing:  Constant  Chronicity:  New  Associated symptoms: no abdominal pain, no chest pain, no congestion, no cough, no diarrhea, no ear pain, no fever, no headaches, no nausea, no shortness of breath, no sore throat and no vomiting        Review of Systems   Constitutional: Negative for chills and fever.   HENT: Negative for congestion, ear pain and sore throat.    Eyes: Negative for pain.   Respiratory: Negative for cough, chest tightness and shortness of breath.    Cardiovascular: Negative for chest pain.   Gastrointestinal: Negative for abdominal pain, diarrhea, nausea and vomiting.   Genitourinary: Negative for flank pain and hematuria.   Musculoskeletal: Negative for joint swelling.   Skin: Negative for pallor.   Neurological: Negative for seizures and headaches.   All other systems reviewed and are negative.      Past Medical History:   Diagnosis Date   • COPD (chronic obstructive pulmonary disease) (CMS/[facility])    • Gout    • H/O cold sores    • Renal disorder        No Known Allergies    Past Surgical History:   Procedure Laterality Date   • COLONOSCOPY  12/05/2019   • LIPOMA EXCISION  2010    removed from back       History reviewed. No pertinent family history.    Social History     Socioeconomic History   • Marital status:      Spouse name: Not on file   • Number of children: Not on file   • Years of education: Not on file   • Highest education level: Not on file   Tobacco Use   • Smoking status: Never Smoker   Vaping Use   • Vaping Use: Never used   Substance and Sexual Activity   • Alcohol use: Never   • Drug use: Never   • Sexual activity: Defer           Objective   Physical Exam  Constitutional:       General: He is not in acute distress.      Appearance: Normal appearance. He is not toxic-appearing.   HENT:      Head: Normocephalic.      Mouth/Throat:      Mouth: Mucous membranes are dry.      Pharynx: Oropharynx is clear.   Eyes:      Extraocular Movements: Extraocular movements intact.      Conjunctiva/sclera: Conjunctivae normal.      Pupils: Pupils are equal, round, and reactive to light.   Cardiovascular:      Rate and Rhythm: Normal rate and regular rhythm.      Pulses: Normal pulses.   Pulmonary:      Effort: Pulmonary effort is normal. No respiratory distress.      Breath sounds: No stridor. No wheezing, rhonchi or rales.   Abdominal:      General: Abdomen is flat. Bowel sounds are normal.      Palpations: Abdomen is soft.   Musculoskeletal:         General: No swelling, tenderness or deformity. Normal range of motion.      Cervical back: Normal range of motion.      Right lower leg: No edema.      Left lower leg: No edema.        Legs:    Skin:     General: Skin is warm and dry.      Findings: No rash.   Neurological:      General: No focal deficit present.      Mental Status: He is alert. Mental status is at baseline.      Cranial Nerves: Cranial nerves are intact.      Sensory: Sensation is intact.      Motor: Motor function is intact.      Coordination: Coordination is intact.   Psychiatric:         Attention and Perception: Attention and perception normal.         Mood and Affect: Mood normal.         Speech: Speech normal.         Behavior: Behavior normal. Behavior is cooperative.         Laceration Repair    Date/Time: 7/21/2021 5:36 PM  Performed by: Yusuf Villasenor APRN  Authorized by: Bruno Yang MD     Consent:     Consent obtained:  Verbal    Consent given by:  Patient    Risks discussed:  Infection, pain, need for additional repair, poor cosmetic result, poor wound healing and tendon damage  Anesthesia (see MAR for exact dosages):     Anesthesia method:  Local infiltration    Local anesthetic:  Lidocaine 1% WITH epi  Laceration  details:     Location:  Leg    Leg location:  R lower leg    Length (cm):  6 (6)    Depth (mm):  2  Repair type:     Repair type:  Simple  Pre-procedure details:     Preparation:  Patient was prepped and draped in usual sterile fashion and imaging obtained to evaluate for foreign bodies  Exploration:     Hemostasis achieved with:  Epinephrine    Wound exploration: wound explored through full range of motion and entire depth of wound probed and visualized      Wound extent: no foreign bodies/material noted, no nerve damage noted, no tendon damage noted and no underlying fracture noted      Contaminated: no    Treatment:     Area cleansed with:  Betadine    Amount of cleaning:  Standard    Irrigation solution:  Sterile saline    Visualized foreign bodies/material removed: no    Skin repair:     Repair method:  Sutures    Suture size:  4-0    Suture material:  Nylon    Suture technique:  Simple interrupted    Number of sutures:  12  Post-procedure details:     Dressing:  Antibiotic ointment and non-adherent dressing    Patient tolerance of procedure:  Tolerated well, no immediate complications               ED Course                                           MDM  Number of Diagnoses or Management Options  Diagnosis management comments: I have spoken with the Mr. Brower. I have explained the patient´s condition, diagnoses and treatment plan based on the information available to me at this time. I have answered the PT questions and addressed any concerns. The patient has a good  understanding of the patient´s diagnosis, condition, and treatment plan as can be expected at this point. The vital signs have been stable. The patient´s condition is stable and appropriate for discharge from the emergency department.      The patient will pursue further outpatient evaluation with the primary care physician or other designated or consulting physician as outlined in the discharge instructions. They are agreeable to this plan of  care and follow-up instructions have been explained in detail. The PT has received these instructions in written format and have expressed an understanding of the discharge instructions. The patient is aware that any significant change in condition or worsening of symptoms should prompt an immediate return to this or the closest emergency department or call to 911.       Amount and/or Complexity of Data Reviewed  Tests in the radiology section of CPT®: ordered and reviewed    Risk of Complications, Morbidity, and/or Mortality  Presenting problems: low  Diagnostic procedures: low  Management options: low    Patient Progress  Patient progress: stable      Final diagnoses:   Laceration of right lower extremity, initial encounter       ED Disposition  ED Disposition     ED Disposition Condition Comment    Discharge Stable           Shannan Noriega, PA-C  [address]  [address] [address]  [phone]      For suture removal         Medication List      No changes were made to your prescriptions during this visit.          Yusuf Villasenor, APRN  07/21/21 9707

## 2021-07-30 ENCOUNTER — OFFICE VISIT (OUTPATIENT)
Dept: INTERNAL MEDICINE | Facility: CLINIC | Age: 56
End: 2021-07-30

## 2021-07-30 VITALS
SYSTOLIC BLOOD PRESSURE: 147 MMHG | TEMPERATURE: 97.3 F | BODY MASS INDEX: 34.68 KG/M2 | HEART RATE: 92 BPM | HEIGHT: 76 IN | DIASTOLIC BLOOD PRESSURE: 106 MMHG | WEIGHT: 284.8 LBS | OXYGEN SATURATION: 98 %

## 2021-07-30 DIAGNOSIS — E55.9 VITAMIN D DEFICIENCY: ICD-10-CM

## 2021-07-30 DIAGNOSIS — Z48.02 ENCOUNTER FOR REMOVAL OF SUTURES: ICD-10-CM

## 2021-07-30 DIAGNOSIS — R21 RASH AND NONSPECIFIC SKIN ERUPTION: ICD-10-CM

## 2021-07-30 DIAGNOSIS — I10 UNCONTROLLED HYPERTENSION: Primary | ICD-10-CM

## 2021-07-30 DIAGNOSIS — R97.20 ELEVATED PSA: ICD-10-CM

## 2021-07-30 DIAGNOSIS — B00.9 HSV INFECTION: ICD-10-CM

## 2021-07-30 DIAGNOSIS — K42.9 UMBILICAL HERNIA WITHOUT OBSTRUCTION AND WITHOUT GANGRENE: ICD-10-CM

## 2021-07-30 DIAGNOSIS — R73.01 IMPAIRED FASTING GLUCOSE: ICD-10-CM

## 2021-07-30 DIAGNOSIS — D17.0 LIPOMA OF HEAD: ICD-10-CM

## 2021-07-30 DIAGNOSIS — M1A.09X0 CHRONIC GOUT OF MULTIPLE SITES, UNSPECIFIED CAUSE: ICD-10-CM

## 2021-07-30 PROBLEM — M10.9 GOUT: Status: ACTIVE | Noted: 2021-07-30

## 2021-07-30 PROCEDURE — 99214 OFFICE O/P EST MOD 30 MIN: CPT | Performed by: NURSE PRACTITIONER

## 2021-07-30 RX ORDER — COLCHICINE 0.6 MG/1
0.6 TABLET ORAL DAILY
Qty: 30 TABLET | Refills: 0 | Status: SHIPPED | OUTPATIENT
Start: 2021-07-30 | End: 2022-07-27 | Stop reason: SDUPTHER

## 2021-07-30 RX ORDER — METOPROLOL SUCCINATE 25 MG/1
25 TABLET, EXTENDED RELEASE ORAL DAILY
COMMUNITY
Start: 2021-07-13 | End: 2022-07-27

## 2021-07-30 RX ORDER — VALACYCLOVIR HYDROCHLORIDE 1 G/1
1000 TABLET, FILM COATED ORAL 2 TIMES DAILY PRN
Qty: 90 TABLET | Refills: 3 | Status: CANCELLED | OUTPATIENT
Start: 2021-07-30

## 2021-07-30 RX ORDER — VALACYCLOVIR HYDROCHLORIDE 500 MG/1
500 TABLET, FILM COATED ORAL DAILY
Qty: 90 TABLET | Refills: 3 | Status: SHIPPED | OUTPATIENT
Start: 2021-07-30 | End: 2022-07-27 | Stop reason: SDUPTHER

## 2021-07-30 NOTE — PROGRESS NOTES
Chief Complaint  Suture / Staple Removal (pt may need stitches removed today, per your request. ), Abdominal Pain (pt complains of hernia/ abdominal pain. ), and skin (pt complains of scaly skin on ankles, would like dermatology referral. )    Subjective    History of Present Illness:  Alli Brower [facility] of hypertension, prediabetes, chronic gout, HSV depression therapy, and vitamin D deficiency.  [facility] and those were reviewed in detail with the patient today.  Hemoglobin A1c is 6.1.  Glucose is controlled by lifestyle modification.  He has a family history in which his father had diabetes, hypertension, heart disease, and abdominal aortic aneurysm.  He states that his rheumatologist that he was seen for gout is no longer in the area.  He takes colchicine and allopurinol to treat this.  He has also been on Valtrex for suppression therapy of HSV-1.  The patient needs refills.  The patient states that he did not take his antihypertensive today and misses doses.    The patient also was seen at the emergency department on 7/21/2021 for a right ankle injury where a horse knocked a bar into his ankle causing a laceration.  He has 12 sutures that he is requesting removal for.  The emergency department note was reviewed.    The patient is complaining of a rash to his lower extremities.  He reports that the rash is chronic, intermittent, and itchy.  He has had the rash on other areas of his body.  He has had no treatment or seeing a dermatologist.  He is requesting a referral.  He also has a lipoma to the right side of his head and it is not a new finding.  He is asking about removal of this and would like a referral.  In addition he has an umbilical hernia that he reports is painful at times and radiates into the testicle and the pain has become more frequent.    Objective   Vital Signs:   BP (!) 147/106 (BP  "Location: Left arm, Patient Position: Sitting, Cuff Size: Adult)   Pulse 92   Temp 97.3 °F (36.3 °C) (Temporal)   Ht 193 cm (76\")   Wt 129 kg (284 lb 12.8 oz)   SpO2 98%   BMI 34.67 kg/m²       Physical Exam :  Vital Signs: Blood pressure 148/96 manual cuff on left arm  General: Well nourished, well hydrated, in no acute distress  HEENT: Lipoma to right side of head; conjunctiva clear, no exudate bilateral  Skin: Flesh colored papules to lower extremites; right ankle with 12 sutures intact and mildly erythematous, no drainage or edema  Cardiovascular: S1 S2, regular rate and rhythm, no murmur  Respiratory: Clear to auscultation bilateral, no wheezes, rhonchi, or rales  Chest: Normal shape, no deformity, normal work of breathing  Abdomen: Umbilical hernia painful with palpation  Extremities: No lower extremity edema  Neurological: Alert, conversing normally  Psychological: Good eye contact, mood good, and judgment intact          Assessment and Plan:  Diagnoses and all orders for this visit:    1. Uncontrolled hypertension (Primary)  Comments:  Education on importance of taking medication and controlling BP. Patient was instructed to keep a BP log a.m. and p.m. and RTC in 2 weeks to reevaluate.    2. Impaired fasting glucose  Comments:  Education on lifestyle modification to control blood sugar.    3. Umbilical hernia without obstruction and without gangrene  -     Ambulatory Referral to General Surgery    4. Lipoma of head  -     Ambulatory Referral to Dermatology  -     Ambulatory Referral to General Surgery    5. Rash and nonspecific skin eruption  -     Ambulatory Referral to Dermatology    6. Chronic gout of multiple sites, unspecified cause  -     colchicine 0.6 MG tablet; Take 1 tablet by mouth Daily.  Dispense: 30 tablet; Refill: 0    7. HSV infection  Comments:  Education on suppression therapy of HSV infection.  Recommend weaning post 6 months treatment and treating as needed.  Orders:  -     " valACYclovir (Valtrex) 500 MG tablet; Take 1 tablet by mouth Daily.  Dispense: 90 tablet; Refill: 3    8. Elevated PSA    9. Vitamin D deficiency      Follow Up:  Patient was given instructions and counseling regarding condition. Return in about 2 weeks (around 8/13/2021) for Recheck for elevated BP.

## 2021-08-13 ENCOUNTER — HOSPITAL ENCOUNTER (OUTPATIENT)
Dept: GENERAL RADIOLOGY | Facility: HOSPITAL | Age: 56
Discharge: HOME | End: 2021-08-13

## 2021-08-13 ENCOUNTER — LAB (OUTPATIENT)
Dept: LAB | Facility: HOSPITAL | Age: 56
End: 2021-08-13

## 2021-08-13 ENCOUNTER — OFFICE VISIT (OUTPATIENT)
Dept: INTERNAL MEDICINE | Facility: CLINIC | Age: 56
End: 2021-08-13

## 2021-08-13 VITALS
WEIGHT: 280 LBS | BODY MASS INDEX: 34.1 KG/M2 | DIASTOLIC BLOOD PRESSURE: 80 MMHG | TEMPERATURE: 97.9 F | HEART RATE: 108 BPM | OXYGEN SATURATION: 97 % | HEIGHT: 76 IN | SYSTOLIC BLOOD PRESSURE: 111 MMHG

## 2021-08-13 DIAGNOSIS — I10 UNCONTROLLED HYPERTENSION: ICD-10-CM

## 2021-08-13 DIAGNOSIS — E55.9 VITAMIN D DEFICIENCY: ICD-10-CM

## 2021-08-13 DIAGNOSIS — R07.9 CHEST PAIN IN ADULT: ICD-10-CM

## 2021-08-13 DIAGNOSIS — R73.01 IMPAIRED FASTING GLUCOSE: ICD-10-CM

## 2021-08-13 DIAGNOSIS — I10 ESSENTIAL HYPERTENSION: ICD-10-CM

## 2021-08-13 DIAGNOSIS — R06.02 SHORTNESS OF BREATH ON EXERTION: ICD-10-CM

## 2021-08-13 DIAGNOSIS — R00.0 ELEVATED PULSE RATE: ICD-10-CM

## 2021-08-13 DIAGNOSIS — R07.9 CHEST PAIN IN ADULT: Primary | ICD-10-CM

## 2021-08-13 LAB
25(OH)D3 SERPL-MCNC: 60.7 NG/ML (ref 30–100)
ALBUMIN SERPL-MCNC: 4.5 G/DL (ref 3.5–5.2)
ALBUMIN/GLOB SERPL: 1.7 G/DL
ALP SERPL-CCNC: 71 U/L (ref 39–117)
ALT SERPL W P-5'-P-CCNC: 52 U/L (ref 1–41)
ANION GAP SERPL CALCULATED.3IONS-SCNC: 9.8 MMOL/L (ref 5–15)
AST SERPL-CCNC: 23 U/L (ref 1–40)
BILIRUB SERPL-MCNC: 0.4 MG/DL (ref 0–1.2)
BUN SERPL-MCNC: 16 MG/DL (ref 6–20)
BUN/CREAT SERPL: 12.1 (ref 7–25)
CALCIUM SPEC-SCNC: 9.4 MG/DL (ref 8.6–10.5)
CHLORIDE SERPL-SCNC: 102 MMOL/L (ref 98–107)
CHOLEST SERPL-MCNC: 156 MG/DL (ref 0–200)
CO2 SERPL-SCNC: 28.2 MMOL/L (ref 22–29)
CREAT SERPL-MCNC: 1.32 MG/DL (ref 0.76–1.27)
GFR SERPL CREATININE-BSD FRML MDRD: 57 ML/MIN/1.73
GLOBULIN UR ELPH-MCNC: 2.7 GM/DL
GLUCOSE SERPL-MCNC: 106 MG/DL (ref 65–99)
HBA1C MFR BLD: 5.7 % (ref 4.8–5.6)
HDLC SERPL-MCNC: 33 MG/DL (ref 40–60)
LDLC SERPL CALC-MCNC: 89 MG/DL (ref 0–100)
LDLC/HDLC SERPL: 2.53 {RATIO}
POTASSIUM SERPL-SCNC: 4.5 MMOL/L (ref 3.5–5.2)
PROT SERPL-MCNC: 7.2 G/DL (ref 6–8.5)
SODIUM SERPL-SCNC: 140 MMOL/L (ref 136–145)
TRIGL SERPL-MCNC: 198 MG/DL (ref 0–150)
TROPONIN T SERPL-MCNC: <0.01 NG/ML (ref 0–0.03)
VLDLC SERPL-MCNC: 34 MG/DL (ref 5–40)

## 2021-08-13 PROCEDURE — 82306 VITAMIN D 25 HYDROXY: CPT

## 2021-08-13 PROCEDURE — 36415 COLL VENOUS BLD VENIPUNCTURE: CPT

## 2021-08-13 PROCEDURE — 83036 HEMOGLOBIN GLYCOSYLATED A1C: CPT

## 2021-08-13 PROCEDURE — 93000 ELECTROCARDIOGRAM COMPLETE: CPT | Performed by: NURSE PRACTITIONER

## 2021-08-13 PROCEDURE — 84484 ASSAY OF TROPONIN QUANT: CPT

## 2021-08-13 PROCEDURE — 80061 LIPID PANEL: CPT

## 2021-08-13 PROCEDURE — 99214 OFFICE O/P EST MOD 30 MIN: CPT | Performed by: NURSE PRACTITIONER

## 2021-08-13 PROCEDURE — 80053 COMPREHEN METABOLIC PANEL: CPT

## 2021-08-13 PROCEDURE — 71046 X-RAY EXAM CHEST 2 VIEWS: CPT

## 2021-08-13 RX ORDER — DOXYCYCLINE HYCLATE 100 MG/1
CAPSULE ORAL
COMMUNITY
Start: 2021-08-02 | End: 2022-07-27

## 2021-08-13 RX ORDER — PREDNISONE 20 MG/1
TABLET ORAL
COMMUNITY
Start: 2021-08-03 | End: 2022-07-27 | Stop reason: SDUPTHER

## 2021-08-13 NOTE — PROGRESS NOTES
"Chief Complaint  Follow-up (2 week follow up) and Medication Problem (pt states when he takes BP medication his heart rate is all over the place. and he complains of being short of air. )    Subjective    History of Present Illness:  Alli Brower presents to [facility] for 2 week follow-up of hypertension.  He reports blood pressures have improved.  He complains that he is having heart rate fluctuation and heart palpitations.  He has a history of heart palpitations and saw cardiologist about a year ago at .  He had a heart monitor and stress test which were normal.  Heart palpitations do seem to occur with increased caffeine. He denies headache, diaphoresis, nausea, dizziness, or malaise. Today he complains of shortness of air on exertion since he had Covid illness December 2020.  He has not had the Covid vaccine.  He also complains of intermittent chest pressure with the shortness of air with exertion.  He reports intermittent wheezing and cough that is worse at night.  2 nights ago he complains of chest pain on the left side for approximately 1 to 2 minutes with no other symptoms.    Since his last visit he was seen at dermatology and had Staphylococcus epididymis cultured from his right ankle injury and he is currently taking doxycycline.      Objective   Vital Signs:   /80 (BP Location: Left arm, Patient Position: Sitting, Cuff Size: Large Adult)   Pulse 108   Temp 97.9 °F (36.6 °C) (Temporal)   Ht 193 cm (76\")   Wt 127 kg (280 lb)   SpO2 97%   BMI 34.08 kg/m²       Physical Exam :  General: Well nourished, well hydrated, in no acute distress  HEENT: Conjunctiva clear, no exudate bilateral  Skin: Right ankle laceration healing, no erythematous,  drainage or edema  Cardiovascular: S1 S2, regular rate and rhythm, no murmur  Respiratory: Clear to auscultation bilateral, no wheezes, rhonchi, or rales  Chest: Normal shape, no deformity, normal work of " breathing  Extremities: No lower extremity edema  Neurological: Alert, conversing normally  Psychological: Good eye contact, mood good, and judgment intact      ECG 12 Lead    Date/Time: 8/13/2021 12:00 PM  Performed by: Parul Aleman APRN  Authorized by: Parul Aleman APRN   Comparison: not compared with previous ECG   Previous ECG: no previous ECG available  Rhythm: sinus rhythm  Rate: normal  Conduction: conduction normal  Other: no other findings    Clinical impression: normal ECG  Comments: Consulted with cardiology, Dr. Nichols.              Assessment and Plan:  Diagnoses and all orders for this visit:    Assessment    Assessment     Problem List Items Addressed This Visit        Cardiac and Vasculature    Essential hypertension    Relevant Medications    metoprolol succinate XL (TOPROL-XL) 25 MG 24 hr tablet    Other Relevant Orders    Troponin      Other Visit Diagnoses     Chest pain in adult    -  Primary    Relevant Orders    ECG 12 Lead    Troponin    Shortness of breath on exertion        Relevant Orders    XR Chest PA & Lateral    ECG 12 Lead    Troponin    Elevated pulse rate        Relevant Orders    ECG 12 Lead    Troponin          Advised of signs and symptoms of heart attack and when to seek mediate medical attention.  Will follow up via phone for test results.    Follow Up:  Patient was given instructions and counseling regarding condition. Return if symptoms worsen or fail to improve, for Recheck 3 months.

## 2021-08-13 NOTE — PATIENT INSTRUCTIONS
Nonspecific Chest Pain, Adult  Chest pain can be caused by many different conditions. It can be caused by a condition that is life-threatening and requires treatment right away. It can also be caused by something that is not life-threatening. If you have chest pain, it can be hard to know the difference, so it is important to get help right away to make sure that you do not have a serious condition.  Some life-threatening causes of chest pain include:  · Heart attack.  · A tear in the body's main blood vessel (aortic dissection).  · Inflammation around your heart (pericarditis).  · A problem in the lungs, such as a blood clot (pulmonary embolism) or a collapsed lung (pneumothorax).  Some non life-threatening causes of chest pain include:  · Heartburn.  · Anxiety or stress.  · Damage to the bones, muscles, and cartilage that make up your chest wall.  · Pneumonia or bronchitis.  · Shingles infection (varicella-zoster virus).  Chest pain can feel like:  · Pain or discomfort on the surface of your chest or deep in your chest.  · Crushing, pressure, aching, or squeezing pain.  · Burning or tingling.  · Dull or sharp pain that is worse when you move, cough, or take a deep breath.  · Pain or discomfort that is also felt in your back, neck, jaw, shoulder, or arm, or pain that spreads to any of these areas.  Your chest pain may come and go. It may also be constant. Your health care provider will do lab tests and other studies to find the cause of your pain. Treatment will depend on the cause of your chest pain.  Follow these instructions at home:  Medicines  · Take over-the-counter and prescription medicines only as told by your health care provider.  · If you were prescribed an antibiotic, take it as told by your health care provider. Do not stop taking the antibiotic even if you start to feel better.  Lifestyle    · Rest as directed by your health care provider.  · Do not use any products that contain nicotine or tobacco,  such as cigarettes and e-cigarettes. If you need help quitting, ask your health care provider.  · Do not drink alcohol.  · Make healthy lifestyle choices as recommended. These may include:  ? Getting regular exercise. Ask your health care provider to suggest some activities that are safe for you.  ? Eating a heart-healthy diet. This includes plenty of fresh fruits and vegetables, whole grains, low-fat (lean) protein, and low-fat dairy products. A dietitian can help you find healthy eating options.  ? Maintaining a healthy weight.  ? Managing any other health conditions you have, such as high blood pressure (hypertension) or diabetes.  ? Reducing stress, such as with yoga or relaxation techniques.  General instructions  · Pay attention to any changes in your symptoms. Tell your health care provider about them or any new symptoms.  · Avoid any activities that cause chest pain.  · Keep all follow-up visits as told by your health care provider. This is important. This includes visits for any further testing if your chest pain does not go away.  Contact a health care provider if:  · Your chest pain does not go away.  · You feel depressed.  · You have a fever.  Get help right away if:  · Your chest pain gets worse.  · You have a cough that gets worse, or you cough up blood.  · You have severe pain in your abdomen.  · You faint.  · You have sudden, unexplained chest discomfort.  · You have sudden, unexplained discomfort in your arms, back, neck, or jaw.  · You have shortness of breath at any time.  · You suddenly start to sweat, or your skin gets clammy.  · You feel nausea or you vomit.  · You suddenly feel lightheaded or dizzy.  · You have severe weakness, or unexplained weakness or fatigue.  · Your heart begins to beat quickly, or it feels like it is skipping beats.  These symptoms may represent a serious problem that is an emergency. Do not wait to see if the symptoms will go away. Get medical help right away. Call your  local emergency services (911 in the [place] Do not drive yourself to the hospital.  Summary  · Chest pain can be caused by a condition that is serious and requires urgent treatment. It may also be caused by something that is not life-threatening.  · If you have chest pain, it is very important to see your health care provider. Your health care provider may do lab tests and other studies to find the cause of your pain.  · Follow your health care provider's instructions on taking medicines, making lifestyle changes, and getting emergency treatment if symptoms become worse.  · Keep all follow-up visits as told by your health care provider. This includes visits for any further testing if your chest pain does not go away.  This information is not intended to replace advice given to you by your health care provider. Make sure you discuss any questions you have with your health care provider.  Document Revised: 06/20/2019 Document Reviewed: 06/20/2019  [employer] Patient Education © 2021 [employer]

## 2021-08-16 DIAGNOSIS — R07.9 CHEST PAIN IN ADULT: ICD-10-CM

## 2021-08-16 DIAGNOSIS — R00.0 ELEVATED PULSE RATE: ICD-10-CM

## 2021-08-16 DIAGNOSIS — R06.02 SHORTNESS OF BREATH ON EXERTION: Primary | ICD-10-CM

## 2022-07-26 PROBLEM — M1A.09X0 CHRONIC GOUT OF MULTIPLE SITES: Status: ACTIVE | Noted: 2022-07-26

## 2022-07-27 ENCOUNTER — OFFICE VISIT (OUTPATIENT)
Dept: INTERNAL MEDICINE | Facility: CLINIC | Age: 57
End: 2022-07-27

## 2022-07-27 VITALS
HEIGHT: 76 IN | OXYGEN SATURATION: 95 % | SYSTOLIC BLOOD PRESSURE: 118 MMHG | DIASTOLIC BLOOD PRESSURE: 86 MMHG | TEMPERATURE: 99.3 F | HEART RATE: 87 BPM | BODY MASS INDEX: 32.81 KG/M2 | WEIGHT: 269.4 LBS

## 2022-07-27 DIAGNOSIS — M1A.09X0 CHRONIC GOUT OF MULTIPLE SITES, UNSPECIFIED CAUSE: Chronic | ICD-10-CM

## 2022-07-27 DIAGNOSIS — Z00.00 ANNUAL PHYSICAL EXAM: Primary | ICD-10-CM

## 2022-07-27 DIAGNOSIS — Z12.5 SCREENING FOR MALIGNANT NEOPLASM OF PROSTATE: ICD-10-CM

## 2022-07-27 DIAGNOSIS — Z86.19 HISTORY OF HERPES SIMPLEX INFECTION: ICD-10-CM

## 2022-07-27 DIAGNOSIS — E55.9 VITAMIN D DEFICIENCY: ICD-10-CM

## 2022-07-27 DIAGNOSIS — K42.9 UMBILICAL HERNIA WITHOUT OBSTRUCTION AND WITHOUT GANGRENE: ICD-10-CM

## 2022-07-27 DIAGNOSIS — R73.01 IMPAIRED FASTING GLUCOSE: ICD-10-CM

## 2022-07-27 PROCEDURE — 99396 PREV VISIT EST AGE 40-64: CPT | Performed by: NURSE PRACTITIONER

## 2022-07-27 RX ORDER — COLCHICINE 0.6 MG/1
0.6 TABLET ORAL DAILY
Qty: 30 TABLET | Refills: 5 | Status: SHIPPED | OUTPATIENT
Start: 2022-07-27

## 2022-07-27 RX ORDER — VALACYCLOVIR HYDROCHLORIDE 500 MG/1
500 TABLET, FILM COATED ORAL DAILY
Qty: 90 TABLET | Refills: 3 | Status: SHIPPED | OUTPATIENT
Start: 2022-07-27 | End: 2022-10-03

## 2022-07-27 RX ORDER — ALLOPURINOL 300 MG/1
300 TABLET ORAL DAILY
Qty: 90 TABLET | Refills: 3 | Status: SHIPPED | OUTPATIENT
Start: 2022-07-27

## 2022-07-27 RX ORDER — TAMSULOSIN HYDROCHLORIDE 0.4 MG/1
1 CAPSULE ORAL DAILY
Qty: 90 CAPSULE | Refills: 3 | Status: CANCELLED | OUTPATIENT
Start: 2022-07-27

## 2022-07-27 RX ORDER — PREDNISONE 20 MG/1
40 TABLET ORAL DAILY
Qty: 14 TABLET | Refills: 0 | Status: SHIPPED | OUTPATIENT
Start: 2022-07-27 | End: 2022-08-03

## 2022-07-27 RX ORDER — COLCHICINE 0.6 MG/1
0.6 TABLET ORAL DAILY
Qty: 30 TABLET | Refills: 0 | Status: CANCELLED | OUTPATIENT
Start: 2022-07-27

## 2022-07-27 RX ORDER — PREDNISONE 20 MG/1
TABLET ORAL
Status: CANCELLED | OUTPATIENT
Start: 2022-07-27

## 2022-07-27 RX ORDER — ALLOPURINOL 300 MG/1
300 TABLET ORAL DAILY
Status: CANCELLED | OUTPATIENT
Start: 2022-07-27

## 2022-07-27 NOTE — PROGRESS NOTES
"Chief Complaint  Annual Exam (Needs a psa test)  Subjective        History of Present Illness  Alli Brower presents to [facility] for:     1.  His annual physical exam.     2.  Follow-up of chronic gout, history of HSV, impaired fasting glucose and vitamin D deficiency.     Current Outpatient Medications:   •  allopurinol (ZYLOPRIM) 300 MG tablet, Take 1 tablet by mouth Daily., Disp: 90 tablet, Rfl: 3  •  colchicine 0.6 MG tablet, Take 1 tablet by mouth Daily., Disp: 30 tablet, Rfl: 5  •  predniSONE (DELTASONE) 20 MG tablet, Take 2 tablets by mouth Daily for 7 days. For gout attack, Disp: 14 tablet, Rfl: 0  •  tamsulosin (FLOMAX) 0.4 MG capsule 24 hr capsule, Take 1 capsule by mouth Daily., Disp: , Rfl:   •  valACYclovir (Valtrex) 500 MG tablet, Take 1 tablet by mouth Daily., Disp: 90 tablet, Rfl: 3  No Known Allergies   Past Medical History:   Diagnosis Date   • COPD (chronic obstructive pulmonary disease) (HCC)    • Gout    • H/O cold sores    • Renal disorder       Past Surgical History:   Procedure Laterality Date   • COLONOSCOPY  12/05/2019   • LIPOMA EXCISION  2010    removed from back      Objective   /86 (BP Location: Left arm, Patient Position: Sitting, Cuff Size: Large Adult)   Pulse 87   Temp 99.3 °F (37.4 °C) (Temporal)   Ht 193 cm (76\")   Wt 122 kg (269 lb 6.4 oz)   SpO2 95%   BMI 32.79 kg/m²    Estimated body mass index is 32.79 kg/m² as calculated from the following:    Height as of this encounter: 193 cm (76\").    Weight as of this encounter: 122 kg (269 lb 6.4 oz).   Physical Exam  Vitals reviewed.   Constitutional:       General: He is not in acute distress.     Appearance: Normal appearance.   HENT:      Head: Normocephalic and atraumatic.      Right Ear: Tympanic membrane and ear canal normal.      Left Ear: Tympanic membrane and ear canal normal.   Eyes:      Conjunctiva/sclera: Conjunctivae normal.      Pupils: Pupils are equal, round, and " reactive to light.   Cardiovascular:      Rate and Rhythm: Normal rate and regular rhythm.      Heart sounds: Normal heart sounds. No murmur heard.  Pulmonary:      Effort: Pulmonary effort is normal.      Breath sounds: Normal breath sounds. No wheezing, rhonchi or rales.   Abdominal:      General: Abdomen is flat. There is no distension.      Palpations: Abdomen is soft.      Hernia: A hernia is present. Hernia is present in the umbilical area.   Musculoskeletal:      Cervical back: Neck supple. No tenderness.      Right lower leg: No edema.      Left lower leg: No edema.   Lymphadenopathy:      Cervical: No cervical adenopathy.   Skin:     General: Skin is warm and dry.      Coloration: Skin is not jaundiced or pale.   Neurological:      General: No focal deficit present.      Mental Status: He is alert.   Psychiatric:         Mood and Affect: Mood normal.         Thought Content: Thought content normal.           Assessment and Plan    Diagnoses and all orders for this visit:    1. Annual physical exam (Primary)  Comments:  Discussed healthy diet, exercise, adequate sleep, cancer screening, immunizations, and preventative care. Annual eye exam and twice yearly dental cleaning.   Orders:  -     Comprehensive Metabolic Panel; Future  -     CBC & Differential; Future  -     Lipid Panel; Future  -     TSH; Future  -     Hemoglobin A1c; Future    2. Umbilical hernia without obstruction and without gangrene  Comments:  Worsening.  The patient would like to see Dr. Maxwell to discuss hernia repair.  Orders:  -     Ambulatory Referral to General Surgery    3. Chronic gout of multiple sites, unspecified cause  Comments:  Stable on allopurinol 300mg daily.  Scripts for colchicine and prednisone only as needed for flares.  Orders:  -     allopurinol (ZYLOPRIM) 300 MG tablet; Take 1 tablet by mouth Daily.  Dispense: 90 tablet; Refill: 3  -     colchicine 0.6 MG tablet; Take 1 tablet by mouth Daily.  Dispense: 30 tablet;  Refill: 5  -     predniSONE (DELTASONE) 20 MG tablet; Take 2 tablets by mouth Daily for 7 days. For gout attack  Dispense: 14 tablet; Refill: 0    4. History of herpes simplex infection  Comments:  The patient would like to continue suppression therapy on an as-needed basis.  Orders:  -     valACYclovir (Valtrex) 500 MG tablet; Take 1 tablet by mouth Daily.  Dispense: 90 tablet; Refill: 3    5. Impaired fasting glucose  -     Hemoglobin A1c; Future    6. Vitamin D deficiency  -     Vitamin D 25 Hydroxy; Future    7. Screening for malignant neoplasm of prostate  -     PSA Screen; Future           Follow Up     Patient was given instructions and counseling regarding his condition or for health maintenance advice. Please see specific information pulled into the AVS if appropriate.   Return in about 1 year (around 7/27/2023) for Annual physical.    [employer] [place]

## 2022-10-01 DIAGNOSIS — Z86.19 HISTORY OF HERPES SIMPLEX INFECTION: ICD-10-CM

## 2022-10-03 RX ORDER — VALACYCLOVIR HYDROCHLORIDE 500 MG/1
500 TABLET, FILM COATED ORAL DAILY
Qty: 90 TABLET | Refills: 3 | Status: SHIPPED | OUTPATIENT
Start: 2022-10-03 | End: 2023-01-16 | Stop reason: SDUPTHER

## 2022-12-29 ENCOUNTER — LAB (OUTPATIENT)
Dept: INTERNAL MEDICINE | Facility: CLINIC | Age: 57
End: 2022-12-29

## 2022-12-29 DIAGNOSIS — Z00.00 ANNUAL PHYSICAL EXAM: ICD-10-CM

## 2022-12-29 DIAGNOSIS — R73.01 IMPAIRED FASTING GLUCOSE: ICD-10-CM

## 2022-12-29 DIAGNOSIS — E55.9 VITAMIN D DEFICIENCY: ICD-10-CM

## 2022-12-29 LAB
25(OH)D3 SERPL-MCNC: 31.8 NG/ML (ref 30–100)
ALBUMIN SERPL-MCNC: 4.4 G/DL (ref 3.5–5.2)
ALBUMIN/GLOB SERPL: 1.8 G/DL
ALP SERPL-CCNC: 77 U/L (ref 39–117)
ALT SERPL W P-5'-P-CCNC: 70 U/L (ref 1–41)
ANION GAP SERPL CALCULATED.3IONS-SCNC: 11 MMOL/L (ref 5–15)
AST SERPL-CCNC: 26 U/L (ref 1–40)
BASOPHILS # BLD AUTO: 0.06 10*3/MM3 (ref 0–0.2)
BASOPHILS NFR BLD AUTO: 0.8 % (ref 0–1.5)
BILIRUB SERPL-MCNC: 0.4 MG/DL (ref 0–1.2)
BUN SERPL-MCNC: 17 MG/DL (ref 6–20)
BUN/CREAT SERPL: 12.8 (ref 7–25)
CALCIUM SPEC-SCNC: 9.5 MG/DL (ref 8.6–10.5)
CHLORIDE SERPL-SCNC: 101 MMOL/L (ref 98–107)
CHOLEST SERPL-MCNC: 148 MG/DL (ref 0–200)
CO2 SERPL-SCNC: 29 MMOL/L (ref 22–29)
CREAT SERPL-MCNC: 1.33 MG/DL (ref 0.76–1.27)
DEPRECATED RDW RBC AUTO: 40.3 FL (ref 37–54)
EGFRCR SERPLBLD CKD-EPI 2021: 63.1 ML/MIN/1.73
EOSINOPHIL # BLD AUTO: 0.08 10*3/MM3 (ref 0–0.4)
EOSINOPHIL NFR BLD AUTO: 1.1 % (ref 0.3–6.2)
ERYTHROCYTE [DISTWIDTH] IN BLOOD BY AUTOMATED COUNT: 13 % (ref 12.3–15.4)
GLOBULIN UR ELPH-MCNC: 2.5 GM/DL
GLUCOSE SERPL-MCNC: 111 MG/DL (ref 65–99)
HBA1C MFR BLD: 6.3 % (ref 4.8–5.6)
HCT VFR BLD AUTO: 45.2 % (ref 37.5–51)
HDLC SERPL-MCNC: 34 MG/DL (ref 40–60)
HGB BLD-MCNC: 14.9 G/DL (ref 13–17.7)
IMM GRANULOCYTES # BLD AUTO: 0.04 10*3/MM3 (ref 0–0.05)
IMM GRANULOCYTES NFR BLD AUTO: 0.5 % (ref 0–0.5)
LDLC SERPL CALC-MCNC: 84 MG/DL (ref 0–100)
LDLC/HDLC SERPL: 2.33 {RATIO}
LYMPHOCYTES # BLD AUTO: 1.89 10*3/MM3 (ref 0.7–3.1)
LYMPHOCYTES NFR BLD AUTO: 25 % (ref 19.6–45.3)
MCH RBC QN AUTO: 28.5 PG (ref 26.6–33)
MCHC RBC AUTO-ENTMCNC: 33 G/DL (ref 31.5–35.7)
MCV RBC AUTO: 86.4 FL (ref 79–97)
MONOCYTES # BLD AUTO: 0.62 10*3/MM3 (ref 0.1–0.9)
MONOCYTES NFR BLD AUTO: 8.2 % (ref 5–12)
NEUTROPHILS NFR BLD AUTO: 4.86 10*3/MM3 (ref 1.7–7)
NEUTROPHILS NFR BLD AUTO: 64.4 % (ref 42.7–76)
NRBC BLD AUTO-RTO: 0 /100 WBC (ref 0–0.2)
PLATELET # BLD AUTO: 263 10*3/MM3 (ref 140–450)
PMV BLD AUTO: 10.8 FL (ref 6–12)
POTASSIUM SERPL-SCNC: 4.5 MMOL/L (ref 3.5–5.2)
PROT SERPL-MCNC: 6.9 G/DL (ref 6–8.5)
RBC # BLD AUTO: 5.23 10*6/MM3 (ref 4.14–5.8)
SODIUM SERPL-SCNC: 141 MMOL/L (ref 136–145)
TRIGL SERPL-MCNC: 174 MG/DL (ref 0–150)
TSH SERPL DL<=0.05 MIU/L-ACNC: 2.61 UIU/ML (ref 0.27–4.2)
VLDLC SERPL-MCNC: 30 MG/DL (ref 5–40)
WBC NRBC COR # BLD: 7.55 10*3/MM3 (ref 3.4–10.8)

## 2022-12-29 PROCEDURE — 80050 GENERAL HEALTH PANEL: CPT | Performed by: NURSE PRACTITIONER

## 2022-12-29 PROCEDURE — 82306 VITAMIN D 25 HYDROXY: CPT | Performed by: NURSE PRACTITIONER

## 2022-12-29 PROCEDURE — 80061 LIPID PANEL: CPT | Performed by: NURSE PRACTITIONER

## 2022-12-29 PROCEDURE — 36415 COLL VENOUS BLD VENIPUNCTURE: CPT | Performed by: NURSE PRACTITIONER

## 2022-12-29 PROCEDURE — 83036 HEMOGLOBIN GLYCOSYLATED A1C: CPT | Performed by: NURSE PRACTITIONER

## 2023-01-16 ENCOUNTER — OFFICE VISIT (OUTPATIENT)
Dept: INTERNAL MEDICINE | Facility: CLINIC | Age: 58
End: 2023-01-16
Payer: COMMERCIAL

## 2023-01-16 VITALS
WEIGHT: 298 LBS | HEART RATE: 95 BPM | SYSTOLIC BLOOD PRESSURE: 132 MMHG | TEMPERATURE: 97.2 F | DIASTOLIC BLOOD PRESSURE: 80 MMHG | OXYGEN SATURATION: 99 % | HEIGHT: 76 IN | BODY MASS INDEX: 36.29 KG/M2

## 2023-01-16 DIAGNOSIS — I10 ESSENTIAL HYPERTENSION: ICD-10-CM

## 2023-01-16 DIAGNOSIS — Z00.00 ANNUAL PHYSICAL EXAM: ICD-10-CM

## 2023-01-16 DIAGNOSIS — Z12.5 SCREENING FOR MALIGNANT NEOPLASM OF PROSTATE: ICD-10-CM

## 2023-01-16 DIAGNOSIS — R39.12 WEAK URINE STREAM: ICD-10-CM

## 2023-01-16 DIAGNOSIS — R73.03 PREDIABETES: Primary | ICD-10-CM

## 2023-01-16 DIAGNOSIS — E55.9 VITAMIN D DEFICIENCY: ICD-10-CM

## 2023-01-16 DIAGNOSIS — Z82.49 FAMILY HISTORY OF ABDOMINAL AORTIC ANEURYSM (AAA): ICD-10-CM

## 2023-01-16 DIAGNOSIS — D17.0 LIPOMA OF HEAD: ICD-10-CM

## 2023-01-16 DIAGNOSIS — K42.9 UMBILICAL HERNIA WITHOUT OBSTRUCTION AND WITHOUT GANGRENE: ICD-10-CM

## 2023-01-16 DIAGNOSIS — Z86.19 HISTORY OF HERPES SIMPLEX INFECTION: ICD-10-CM

## 2023-01-16 DIAGNOSIS — E66.9 OBESITY (BMI 35.0-39.9 WITHOUT COMORBIDITY): ICD-10-CM

## 2023-01-16 PROCEDURE — 99214 OFFICE O/P EST MOD 30 MIN: CPT | Performed by: NURSE PRACTITIONER

## 2023-01-16 RX ORDER — VALACYCLOVIR HYDROCHLORIDE 500 MG/1
500 TABLET, FILM COATED ORAL DAILY
Qty: 90 TABLET | Refills: 3 | Status: SHIPPED | OUTPATIENT
Start: 2023-01-16 | End: 2023-01-16

## 2023-01-16 RX ORDER — TAMSULOSIN HYDROCHLORIDE 0.4 MG/1
1 CAPSULE ORAL DAILY
Qty: 90 CAPSULE | OUTPATIENT
Start: 2023-01-16

## 2023-01-16 RX ORDER — TAMSULOSIN HYDROCHLORIDE 0.4 MG/1
1 CAPSULE ORAL DAILY
Qty: 30 CAPSULE | Refills: 1 | Status: SHIPPED | OUTPATIENT
Start: 2023-01-16 | End: 2023-04-04 | Stop reason: SDUPTHER

## 2023-01-16 RX ORDER — SEMAGLUTIDE 0.25 MG/.5ML
0.25 INJECTION, SOLUTION SUBCUTANEOUS WEEKLY
Qty: 0.5 ML | Refills: 5 | Status: SHIPPED | OUTPATIENT
Start: 2023-01-16 | End: 2023-01-16

## 2023-01-16 RX ORDER — SEMAGLUTIDE 0.25 MG/.5ML
0.25 INJECTION, SOLUTION SUBCUTANEOUS WEEKLY
Qty: 0.5 ML | Refills: 5 | Status: SHIPPED | OUTPATIENT
Start: 2023-01-16 | End: 2023-02-07

## 2023-01-16 RX ORDER — VALACYCLOVIR HYDROCHLORIDE 500 MG/1
500 TABLET, FILM COATED ORAL DAILY
Qty: 90 TABLET | Refills: 3 | Status: SHIPPED | OUTPATIENT
Start: 2023-01-16

## 2023-01-16 RX ORDER — TAMSULOSIN HYDROCHLORIDE 0.4 MG/1
1 CAPSULE ORAL DAILY
Qty: 30 CAPSULE | Refills: 1 | Status: SHIPPED | OUTPATIENT
Start: 2023-01-16 | End: 2023-01-16

## 2023-01-16 NOTE — PROGRESS NOTES
Chief Complaint  Other (Needs a dermatology referral, he is concerned with his prostate he states when he urinates theres not a lot of pressure, he states he has more frequency. He states he may need a test done for AAA. )  Subjective    History of Present Illness  Alli Brower is a 57 y.o. male with hypertension, chronic gout, history of HSV, impaired fasting glucose and vitamin D deficency presents to [facility] for follow-up. The patient is not having any medication side effects.  He had normal heart monitor and stress test.  Negative for chest pain, dizziness, headaches and fatigue.  History of heart palpitations related to increased caffeine use.  He continues to have shortness of air since December 2020 with COVID illness.  He is complaining of weak urine stream, pressure with urination and frequency. Recent labs reviewed.    Past Medical History:   Diagnosis Date   • COPD (chronic obstructive pulmonary disease) (HCC)    • Gout    • H/O cold sores    • Renal disorder         Past Surgical History:   Procedure Laterality Date   • COLONOSCOPY  12/05/2019   • LIPOMA EXCISION  2010    removed from back        No Known Allergies       Current Outpatient Medications:   •  allopurinol (ZYLOPRIM) 300 MG tablet, Take 1 tablet by mouth Daily., Disp: 90 tablet, Rfl: 3  •  colchicine 0.6 MG tablet, Take 1 tablet by mouth Daily., Disp: 30 tablet, Rfl: 5  •  Semaglutide-Weight Management (Wegovy) 0.25 MG/0.5ML solution auto-injector, Inject 0.25 mg under the skin into the appropriate area as directed 1 (One) Time Per Week. After 4 weeks increase to 0.5 mg weekly., Disp: 0.5 mL, Rfl: 5  •  tamsulosin (FLOMAX) 0.4 MG capsule 24 hr capsule, Take 1 capsule by mouth Daily., Disp: 30 capsule, Rfl: 1  •  valACYclovir (VALTREX) 500 MG tablet, Take 1 tablet by mouth Daily., Disp: 90 tablet, Rfl: 3    Objective   /80 (BP Location: Right arm, Patient Position: Sitting, Cuff Size: Large Adult)  "  Pulse 95   Temp 97.2 °F (36.2 °C) (Temporal)   Ht 193 cm (76\")   Wt 135 kg (298 lb)   SpO2 99%   BMI 36.27 kg/m²    Estimated body mass index is 36.27 kg/m² as calculated from the following:    Height as of this encounter: 193 cm (76\").    Weight as of this encounter: 135 kg (298 lb).   Physical Exam  Vitals reviewed.   Constitutional:       General: He is not in acute distress.  HENT:      Head: Normocephalic and atraumatic.   Neck:      Comments: No thyroid enlargement  Cardiovascular:      Rate and Rhythm: Normal rate and regular rhythm.   Pulmonary:      Effort: Pulmonary effort is normal.      Breath sounds: Normal breath sounds. No wheezing, rhonchi or rales.   Musculoskeletal:      Right lower leg: No edema.      Left lower leg: No edema.   Lymphadenopathy:      Cervical: No cervical adenopathy.   Skin:     General: Skin is warm and dry.      Comments: Lipoma to right lateral scalp.   Neurological:      General: No focal deficit present.      Mental Status: He is alert.   Psychiatric:         Thought Content: Thought content normal.        Result Review :  The following data was reviewed by: APPLE Madrid on 01/16/2023:  CMP    CMP 12/29/22   Glucose 111 (A)   BUN 17   Creatinine 1.33 (A)   eGFR 63.1   Sodium 141   Potassium 4.5   Chloride 101   Calcium 9.5   Total Protein 6.9   Albumin 4.4   Globulin 2.5   Total Bilirubin 0.4   Alkaline Phosphatase 77   AST (SGOT) 26   ALT (SGPT) 70 (A)   Albumin/Globulin Ratio 1.8   BUN/Creatinine Ratio 12.8   Anion Gap 11.0   (A) Abnormal value       Comments are available for some flowsheets but are not being displayed.           CBC w/diff    CBC w/Diff 12/29/22   WBC 7.55   RBC 5.23   Hemoglobin 14.9   Hematocrit 45.2   MCV 86.4   MCH 28.5   MCHC 33.0   RDW 13.0   Platelets 263   Neutrophil Rel % 64.4   Immature Granulocyte Rel % 0.5   Lymphocyte Rel % 25.0   Monocyte Rel % 8.2   Eosinophil Rel % 1.1   Basophil Rel % 0.8           Lipid Panel    Lipid " Panel 12/29/22   Total Cholesterol 148   Triglycerides 174 (A)   HDL Cholesterol 34 (A)   VLDL Cholesterol 30   LDL Cholesterol  84   LDL/HDL Ratio 2.33   (A) Abnormal value            TSH    TSH 12/29/22   TSH 2.610           A1C Last 3 Results    HGBA1C Last 3 Results 12/29/22   Hemoglobin A1C 6.30 (A)   (A) Abnormal value                         Assessment and Plan   Diagnoses and all orders for this visit:    1. Prediabetes (Primary)  Comments:  HA1C 6.30 and worsening.  Discussed lifestyle modifications and treatment plan.  Orders:  -     Hemoglobin A1c; Future    2. Obesity (BMI 35.0-39.9 without comorbidity)  Comments:  Start Wegovy.  Discussed diet and exercise.     3. Essential hypertension  Comments:  Blood pressure well controlled without medication.    4. History of herpes simplex infection  Comments:  Stable on medication and to continue suppression therapy on an as-needed basis.    5. Vitamin D deficiency  Comments:  Will check level next visit.  Orders:  -     Vitamin D,25-Hydroxy; Future    6. Lipoma of head  -     Ambulatory Referral to General Surgery    7. Umbilical hernia without obstruction and without gangrene  -     Ambulatory Referral to General Surgery    8. Weak urine stream  -     Ambulatory Referral to Urology    9. Screening for malignant neoplasm of prostate  -     PSA Screen; Future    10. Family history of abdominal aortic aneurysm (AAA)  -     US AAA Screen Limited; Future    11. Annual physical exam  Comments:  Wellness labs ordered.  Orders:  -     Comprehensive Metabolic Panel; Future  -     CBC & Differential; Future  -     Lipid Panel; Future  -     T4, Free; Future  -     TSH; Future    Other orders  -     Discontinue: tamsulosin (FLOMAX) 0.4 MG capsule 24 hr capsule; Take 1 capsule by mouth Daily.  Dispense: 30 capsule; Refill: 1  -     Discontinue: valACYclovir (VALTREX) 500 MG tablet; Take 1 tablet by mouth Daily.  Dispense: 90 tablet; Refill: 3  -     Discontinue:  Semaglutide-Weight Management (Wegovy) 0.25 MG/0.5ML solution auto-injector; Inject 0.25 mg under the skin into the appropriate area as directed 1 (One) Time Per Week. After 4 weeks increase to 0.5 mg weekly.  Dispense: 0.5 mL; Refill: 5  -     valACYclovir (VALTREX) 500 MG tablet; Take 1 tablet by mouth Daily.  Dispense: 90 tablet; Refill: 3  -     tamsulosin (FLOMAX) 0.4 MG capsule 24 hr capsule; Take 1 capsule by mouth Daily.  Dispense: 30 capsule; Refill: 1  -     Semaglutide-Weight Management (Wegovy) 0.25 MG/0.5ML solution auto-injector; Inject 0.25 mg under the skin into the appropriate area as directed 1 (One) Time Per Week. After 4 weeks increase to 0.5 mg weekly.  Dispense: 0.5 mL; Refill: 5       Patient was given instructions and counseling regarding his condition or for health maintenance advice. Please see specific information pulled into the AVS if appropriate.   Follow Up   Return in about 6 months (around 7/16/2023) for Annual physical.    APP[facility] [place]

## 2023-01-16 NOTE — PATIENT INSTRUCTIONS
Calorie Counting for Weight Loss  Calories are units of energy. Your body needs a certain number of calories from food to keep going throughout the day. When you eat or drink more calories than your body needs, your body stores the extra calories mostly as fat. When you eat or drink fewer calories than your body needs, your body burns fat to get the energy it needs.  Calorie counting means keeping track of how many calories you eat and drink each day. Calorie counting can be helpful if you need to lose weight. If you eat fewer calories than your body needs, you should lose weight. Ask your health care provider what a healthy weight is for you.  For calorie counting to work, you will need to eat the right number of calories each day to lose a healthy amount of weight per week. A dietitian can help you figure out how many calories you need in a day and will suggest ways to reach your calorie goal.  A healthy amount of weight to lose each week is usually 1-2 lb (0.5-0.9 kg). This usually means that your daily calorie intake should be reduced by 500-750 calories.  Eating 1,200-1,500 calories a day can help most women lose weight.  Eating 1,500-1,800 calories a day can help most men lose weight.  What do I need to know about calorie counting?  Work with your health care provider or dietitian to determine how many calories you should get each day. To meet your daily calorie goal, you will need to:  Find out how many calories are in each food that you would like to eat. Try to do this before you eat.  Decide how much of the food you plan to eat.  Keep a food log. Do this by writing down what you ate and how many calories it had.  To successfully lose weight, it is important to balance calorie counting with a healthy lifestyle that includes regular activity.  Where do I find calorie information?  The number of calories in a food can be found on a Nutrition Facts label. If a food does not have a Nutrition Facts label, try to  look up the calories online or ask your dietitian for help.  Remember that calories are listed per serving. If you choose to have more than one serving of a food, you will have to multiply the calories per serving by the number of servings you plan to eat. For example, the label on a package of bread might say that a serving size is 1 slice and that there are 90 calories in a serving. If you eat 1 slice, you will have eaten 90 calories. If you eat 2 slices, you will have eaten 180 calories.  How do I keep a food log?  After each time that you eat, record the following in your food log as soon as possible:  What you ate. Be sure to include toppings, sauces, and other extras on the food.  How much you ate. This can be measured in cups, ounces, or number of items.  How many calories were in each food and drink.  The total number of calories in the food you ate.  Keep your food log near you, such as in a pocket-sized notebook or on an tiffanie or website on your mobile phone. Some programs will calculate calories for you and show you how many calories you have left to meet your daily goal.  What are some portion-control tips?  Know how many calories are in a serving. This will help you know how many servings you can have of a certain food.  Use a measuring cup to measure serving sizes. You could also try weighing out portions on a kitchen scale. With time, you will be able to estimate serving sizes for some foods.  Take time to put servings of different foods on your favorite plates or in your favorite bowls and cups so you know what a serving looks like.  Try not to eat straight from a food's packaging, such as from a bag or box. Eating straight from the package makes it hard to see how much you are eating and can lead to overeating. Put the amount you would like to eat in a cup or on a plate to make sure you are eating the right portion.  Use smaller plates, glasses, and bowls for smaller portions and to prevent  overeating.  Try not to multitask. For example, avoid watching TV or using your computer while eating. If it is time to eat, sit down at a table and enjoy your food. This will help you recognize when you are full. It will also help you be more mindful of what and how much you are eating.  What are tips for following this plan?  Reading food labels  Check the calorie count compared with the serving size. The serving size may be smaller than what you are used to eating.  Check the source of the calories. Try to choose foods that are high in protein, fiber, and vitamins, and low in saturated fat, trans fat, and sodium.  Shopping  Read nutrition labels while you shop. This will help you make healthy decisions about which foods to buy.  Pay attention to nutrition labels for low-fat or fat-free foods. These foods sometimes have the same number of calories or more calories than the full-fat versions. They also often have added sugar, starch, or salt to make up for flavor that was removed with the fat.  Make a grocery list of lower-calorie foods and stick to it.  Cooking  Try to cook your favorite foods in a healthier way. For example, try baking instead of frying.  Use low-fat dairy products.  Meal planning  Use more fruits and vegetables. One-half of your plate should be fruits and vegetables.  Include lean proteins, such as chicken, turkey, and fish.  Lifestyle  Each week, aim to do one of the followin minutes of moderate exercise, such as walking.  75 minutes of vigorous exercise, such as running.  General information  Know how many calories are in the foods you eat most often. This will help you calculate calorie counts faster.  Find a way of tracking calories that works for you. Get creative. Try different apps or programs if writing down calories does not work for you.  What foods should I eat?    Eat nutritious foods. It is better to have a nutritious, high-calorie food, such as an avocado, than a food with  few nutrients, such as a bag of potato chips.  Use your calories on foods and drinks that will fill you up and will not leave you hungry soon after eating.  Examples of foods that fill you up are nuts and nut butters, vegetables, lean proteins, and high-fiber foods such as whole grains. High-fiber foods are foods with more than 5 g of fiber per serving.  Pay attention to calories in drinks. Low-calorie drinks include water and unsweetened drinks.  The items listed above may not be a complete list of foods and beverages you can eat. Contact a dietitian for more information.  What foods should I limit?  Limit foods or drinks that are not good sources of vitamins, minerals, or protein or that are high in unhealthy fats. These include:  Candy.  Other sweets.  Sodas, specialty coffee drinks, alcohol, and juice.  The items listed above may not be a complete list of foods and beverages you should avoid. Contact a dietitian for more information.  How do I count calories when eating out?  Pay attention to portions. Often, portions are much larger when eating out. Try these tips to keep portions smaller:  Consider sharing a meal instead of getting your own.  If you get your own meal, eat only half of it. Before you start eating, ask for a container and put half of your meal into it.  When available, consider ordering smaller portions from the menu instead of full portions.  Pay attention to your food and drink choices. Knowing the way food is cooked and what is included with the meal can help you eat fewer calories.  If calories are listed on the menu, choose the lower-calorie options.  Choose dishes that include vegetables, fruits, whole grains, low-fat dairy products, and lean proteins.  Choose items that are boiled, broiled, grilled, or steamed. Avoid items that are buttered, battered, fried, or served with cream sauce. Items labeled as crispy are usually fried, unless stated otherwise.  Choose water, low-fat milk,  unsweetened iced tea, or other drinks without added sugar. If you want an alcoholic beverage, choose a lower-calorie option, such as a glass of wine or light beer.  Ask for dressings, sauces, and syrups on the side. These are usually high in calories, so you should limit the amount you eat.  If you want a salad, choose a garden salad and ask for grilled meats. Avoid extra toppings such as lofton, cheese, or fried items. Ask for the dressing on the side, or ask for olive oil and vinegar or lemon to use as dressing.  Estimate how many servings of a food you are given. Knowing serving sizes will help you be aware of how much food you are eating at restaurants.  Where to find more information  Centers for Disease Control and Prevention: www.cdc.gov  [place] Department of Agriculture: myplate.gov  Summary  Calorie counting means keeping track of how many calories you eat and drink each day. If you eat fewer calories than your body needs, you should lose weight.  A healthy amount of weight to lose per week is usually 1-2 lb (0.5-0.9 kg). This usually means reducing your daily calorie intake by 500-750 calories.  The number of calories in a food can be found on a Nutrition Facts label. If a food does not have a Nutrition Facts label, try to look up the calories online or ask your dietitian for help.  Use smaller plates, glasses, and bowls for smaller portions and to prevent overeating.  Use your calories on foods and drinks that will fill you up and not leave you hungry shortly after a meal.  This information is not intended to replace advice given to you by your health care provider. Make sure you discuss any questions you have with your health care provider.  Document Revised: 01/28/2021 Document Reviewed: 01/28/2021  Elsevier Patient Education © 2022 [employer]

## 2023-02-03 ENCOUNTER — LAB (OUTPATIENT)
Dept: INTERNAL MEDICINE | Facility: CLINIC | Age: 58
End: 2023-02-03
Payer: COMMERCIAL

## 2023-02-03 DIAGNOSIS — Z12.5 SCREENING FOR MALIGNANT NEOPLASM OF PROSTATE: ICD-10-CM

## 2023-02-03 LAB — PSA SERPL-MCNC: 3.5 NG/ML (ref 0–4)

## 2023-02-03 PROCEDURE — G0103 PSA SCREENING: HCPCS | Performed by: NURSE PRACTITIONER

## 2023-02-03 PROCEDURE — 36415 COLL VENOUS BLD VENIPUNCTURE: CPT | Performed by: NURSE PRACTITIONER

## 2023-02-07 ENCOUNTER — TELEPHONE (OUTPATIENT)
Dept: INTERNAL MEDICINE | Facility: CLINIC | Age: 58
End: 2023-02-07

## 2023-02-07 RX ORDER — SEMAGLUTIDE 0.5 MG/.5ML
0.5 INJECTION, SOLUTION SUBCUTANEOUS WEEKLY
Qty: 0.5 ML | Refills: 5 | Status: SHIPPED | OUTPATIENT
Start: 2023-02-07 | End: 2023-03-13

## 2023-02-07 NOTE — TELEPHONE ENCOUNTER
Patient tried to refill wegovy and the pharmacy stated he would need a new prescription sent in. I am sending in the 0.5mg

## 2023-02-07 NOTE — TELEPHONE ENCOUNTER
Caller: Alli Brower    Relationship: Self    Best call back number: [phone]    What medication are you requesting: INCREASED WEGOVY MEDICATION DUE TO HEIGHT AND WEIGHT    Have you had these symptoms before:    [x] Yes  [] No    Have you been treated for these symptoms before:   [x] Yes  [] No    If a prescription is needed, what is your preferred pharmacy and phone number: [id number] - [place] - [address] [facility] [phone] [facility] [phone] FX     Additional notes:  PATIENT IS REQUESTING CALL WHEN COMPLETED.

## 2023-02-13 ENCOUNTER — OFFICE VISIT (OUTPATIENT)
Dept: SURGERY | Facility: CLINIC | Age: 58
End: 2023-02-13
Payer: COMMERCIAL

## 2023-02-13 VITALS — BODY MASS INDEX: 36.29 KG/M2 | RESPIRATION RATE: 16 BRPM | HEIGHT: 76 IN | WEIGHT: 298 LBS

## 2023-02-13 DIAGNOSIS — R22.0 SCALP MASS: Primary | ICD-10-CM

## 2023-02-13 DIAGNOSIS — K42.9 UMBILICAL HERNIA WITHOUT OBSTRUCTION AND WITHOUT GANGRENE: ICD-10-CM

## 2023-02-13 PROCEDURE — 99204 OFFICE O/P NEW MOD 45 MIN: CPT | Performed by: SURGERY

## 2023-02-13 NOTE — PROGRESS NOTES
Chief Complaint:  Hernia    Primary Care Provider: Parul Aleman APRN    Referring Provider: Parul Aleman APRN    History of Present Illness  Alli Brower is a 57 y.o. male referred by [facility] for 2 issues.  One is a scalp mass.  The mass is located at the patient's right occipital area.  It has been present for a few years and patient has pain when there is pressure placed over the area.  He would like to have it removed.  Additionally, the patient has a small bulge at his umbilicus.  The bulge has been present for at least a year.  No prior abdominal surgeries.  Patient occasionally has pain at his umbilicus.  He says he has gained about 30 pounds over the past 6 months and plans to go on a diet and lose anywhere from 30 to 50 pounds.    Allergies: Patient has no known allergies.    Outpatient Medications Marked as Taking for the 2/13/23 encounter (Office Visit) with Meño Maxwell MD   Medication Sig Dispense Refill   • allopurinol (ZYLOPRIM) 300 MG tablet Take 1 tablet by mouth Daily. 90 tablet 3   • colchicine 0.6 MG tablet Take 1 tablet by mouth Daily. 30 tablet 5   • Semaglutide-Weight Management (Wegovy) 0.5 MG/0.5ML solution auto-injector Inject 0.5 mL under the skin into the appropriate area as directed 1 (One) Time Per Week. 0.5 mL 5   • tamsulosin (FLOMAX) 0.4 MG capsule 24 hr capsule Take 1 capsule by mouth Daily. 30 capsule 1   • valACYclovir (VALTREX) 500 MG tablet Take 1 tablet by mouth Daily. 90 tablet 3       Past Medical History:   • COPD (chronic obstructive pulmonary disease) (HCC)   • Gout   • H/O cold sores   • Renal disorder        Past Surgical History:   • COLONOSCOPY   • LIPOMA EXCISION    removed from back       Family History: History reviewed. No pertinent family history.     Social History:  Social History     Tobacco Use   • Smoking status: Never   • Smokeless tobacco: Never   Substance Use Topics   • Alcohol use: Never       Objective     Vital Signs:  Resp  "16   Ht 193 cm (76\")   Wt 135 kg (298 lb)   BMI 36.27 kg/m²   • Constitutional: wife present, alert, no acute distress, reliable historian  • HENT:  NCAT, no visible deformities or lesions, approximately 3 to 4 cm diameter well circumscribed, soft, mobile, nontender subcutaneous scalp mass at the right lower posterior scalp  • Eyes:  sclerae clear, conjunctivae clear, EOMI  • Neck:  normal appearance, no masses, trachea midline  • Respiratory:  breathing not labored, respiratory effort appears normal  • Cardiovascular:  heart regular rate  • Abdomen:  soft, nontender, nondistended.  Small soft bulge at umbilicus.  Skin overlying bulge is normal appearing.  Did not attempt to reduce bulge.  • Skin and subcutaneous tissue:  Warm & dry, no jaundice, see above head section  • Musculoskeletal: moving all extremities symmetrically and purposefully  • Neurologic:  no obvious motor or sensory deficits, normal gait, able to stand without difficulty, cerebellar function without any obvious abnormalities, alert & oriented x 3, speech clear  • Psychiatric:  judgment and insight intact, mood normal, affect appropriate, cooperative      Assessment:  1. Scalp mass  2. Umbilical hernia    Plan:  Will excise the scalp mass as an in-office procedure on February 24.  Plan umbilical hernia repair but repair will be deferred until sometime after patient loses weight (see above HPI section).    Discussion: Indications, options, risks, benefits, and expected outcomes of planned procedure were discussed with the patient and he agrees to proceed.    Meño Maxwell MD  02/13/2023    Electronically signed by Meño Maxwell MD, 02/13/23, 2:42 PM EST.      "

## 2023-02-24 ENCOUNTER — OFFICE VISIT (OUTPATIENT)
Dept: SURGERY | Facility: CLINIC | Age: 58
End: 2023-02-24
Payer: COMMERCIAL

## 2023-02-24 VITALS — HEIGHT: 76 IN | WEIGHT: 297 LBS | BODY MASS INDEX: 36.17 KG/M2 | RESPIRATION RATE: 16 BRPM

## 2023-02-24 DIAGNOSIS — D17.0 LIPOMA OF SCALP: Primary | ICD-10-CM

## 2023-02-24 PROCEDURE — 21011 EXC FACE LES SC <2 CM: CPT | Performed by: SURGERY

## 2023-02-24 RX ORDER — SEMAGLUTIDE 0.25 MG/.5ML
INJECTION, SOLUTION SUBCUTANEOUS
COMMUNITY
Start: 2023-02-13 | End: 2023-03-13

## 2023-02-24 NOTE — PROGRESS NOTES
Preoperative diagnosis:    Lipoma of scalp     Postoperative diagnosis:  Lipoma of scalp    Procedure:  Excision of scalp lipoma     Surgeon:  Meño Maxwell M.D.     Anesthesia:  3 ml  Lidocaine     Assistant:  Bernadine Romero     EBL:  20 ml     Complications:  None     Specimen:  None     Indications:  See office note dated 2/13/23    Findings:  Approximately 2 cm diameter lipoma removed.  Excised tissue was discarded.     Technique: Patient was taken to the procedure room and placed appropriately on the procedure table.  Consent had already been obtained.    The right posterior scalp just behind the right ear was prepped and draped in the usual fashion.  Local anesthesia was infiltrated to the skin and subcutaneous tissue.  A 15 blade knife was used to make an incision through the skin and dermis into the subcutaneous tissue and a fatty consistency subcutaneous mass was circumferentially dissected and removed.  There was adequate hemostasis at the end of the procedure.  Gross findings were consistent with a benign lipoma.  Excised tissue was discarded.  Wound was closed with staples.  No complications.  Patient did well during the procedure.    Follow-up:  Wound care instructions were provided verbally.  Patient will follow-up with me in about 10 days.    Meño Maxwell MD  02/24/2023    Electronically signed by Meño Maxwell MD, 02/24/23, 5:14 PM EST.

## 2023-02-27 NOTE — PROGRESS NOTES
Chief Complaint: weak urinary stream    Subjective         History of Present Illness  Alli Brower is a 57 y.o. male presents to [facility] to be seen for BPH.      Patient presents reporting he is having post void dribbling for a couple of years. He reports he does not have the same sex drive as he has had previously. He has gained weight since changing jobs- 30lbs from August to January. He is currently on Weygovy and making diet changes to see if this helps. Has stopped soda and doing a Mediterranean diet.     He was started on Tamsulosin 2 weeks ago. Previously had been on it over a year ago but stopped it due to concern that he could have side effects.     Reports going less frequently at night since starting Tamsulosin.       Post void dribbling    Frequency- every 1-2 hours    Urgency- with some leakage - rare    Nocturia x 2    GH- denies    Incontinence- with urgency- rare    History of  surgery- denies    Family history of  malignancy- denies    Cardiopulmonary- denies    Non-Smoker    Anticoagulants- denies    PSA  2/3/2023 3.5  12/30/2020 3.39  11/7/2019 3.07      Objective     Past Medical History:   Diagnosis Date   • COPD (chronic obstructive pulmonary disease) (HCC)    • Gout    • H/O cold sores    • Renal disorder        Past Surgical History:   Procedure Laterality Date   • COLONOSCOPY  12/05/2019   • LIPOMA EXCISION  2010    removed from back         Current Outpatient Medications:   •  allopurinol (ZYLOPRIM) 300 MG tablet, Take 1 tablet by mouth Daily., Disp: 90 tablet, Rfl: 3  •  colchicine 0.6 MG tablet, Take 1 tablet by mouth Daily., Disp: 30 tablet, Rfl: 5  •  Semaglutide-Weight Management (Wegovy) 0.5 MG/0.5ML solution auto-injector, Inject 0.5 mL under the skin into the appropriate area as directed 1 (One) Time Per Week., Disp: 0.5 mL, Rfl: 5  •  tamsulosin (FLOMAX) 0.4 MG capsule 24 hr capsule, Take 1 capsule by mouth Daily., Disp: 30 capsule, Rfl: 1  •   "valACYclovir (VALTREX) 500 MG tablet, Take 1 tablet by mouth Daily., Disp: 90 tablet, Rfl: 3  •  Wegovy 0.25 MG/0.5ML solution auto-injector, , Disp: , Rfl:     No Known Allergies     No family history on file.    Social History     Socioeconomic History   • Marital status:    Tobacco Use   • Smoking status: Never     Passive exposure: Past   • Smokeless tobacco: Never   Vaping Use   • Vaping Use: Never used   Substance and Sexual Activity   • Alcohol use: Never   • Drug use: Never   • Sexual activity: Defer       Vital Signs:   Ht 193 cm (75.98\")   Wt 133 kg (292 lb 12.8 oz)   BMI 35.66 kg/m²      Physical Exam  Vitals reviewed.   Neurological:      General: No focal deficit present.      Mental Status: He is alert.   Psychiatric:         Mood and Affect: Mood normal.          Result Review :   The following data was reviewed by: APPLE Box on 02/28/2023:  Results for orders placed or performed in visit on 02/03/23   PSA Screen    Specimen: Arm, Left; Blood   Result Value Ref Range    PSA 3.500 0.000 - 4.000 ng/mL      PSA    PSA 2/3/23   PSA 3.500           Bladder Scan interpretation 02/28/2023    Estimation of residual urine via BVI 3000 Verathon Bladder Scan  MA/nurse performing: Carlyn FRYE RN  Residual Urine: 0 ml  Indication: Weak urinary stream    Benign prostatic hyperplasia (BPH) with post-void dribbling   Position: Supine  Examination: Incremental scanning of the suprapubic area using 2.0 MHz transducer using copious amounts of acoustic gel.   Findings: An anechoic area was demonstrated which represented the bladder, with measurement of residual urine as noted. I inspected this myself. In that the residual urine was  insignificant, refer to plan for treatment and plan necessary at this time.           Procedures        Assessment and Plan    Diagnoses and all orders for this visit:    1. Weak urinary stream (Primary)  -     Bladder Scan    2. Benign prostatic hyperplasia (BPH) with " post-void dribbling    Post void dribbling- discussed with patient; discussed that this is a very common condition that is bothersome to many men, and is believed to be due to the male anatomy. Unfortunately there are no medications or surgical management; recommendations are conservative and behavioral including taking time to void, and shaking the penis after voiding.    BPH with Luts- behavioral modifications including fluid management, limiting fluids prior to sleep, and voiding immediately prior to sleep.      Consider workup for LOLI per PCP as studies have shown that with treatment of sleep apnea, nocuria can be significantly reduced.      Continue conservative management of BPH Luts via behavioral modifications and medications; monitor for adverse outcomes of BPH which would warrant discussion of further management (ie hydronephrosis, recurrent uti, bladder stones, urinary retention, or renal insufficiency)     Continue Flomax    Will follow up in 3 months or sooner for new concerns.         Follow Up   No follow-ups on file.  Patient was given instructions and counseling regarding his condition or for health maintenance advice. Please see specific information pulled into the AVS if appropriate.         This document has been electronically signed by APPLE Box  February 28, 2023 10:49 EST

## 2023-02-28 ENCOUNTER — OFFICE VISIT (OUTPATIENT)
Dept: UROLOGY | Facility: CLINIC | Age: 58
End: 2023-02-28
Payer: COMMERCIAL

## 2023-02-28 VITALS — HEIGHT: 76 IN | WEIGHT: 292.8 LBS | BODY MASS INDEX: 35.65 KG/M2

## 2023-02-28 DIAGNOSIS — N39.43 BENIGN PROSTATIC HYPERPLASIA (BPH) WITH POST-VOID DRIBBLING: ICD-10-CM

## 2023-02-28 DIAGNOSIS — N40.1 BENIGN PROSTATIC HYPERPLASIA (BPH) WITH POST-VOID DRIBBLING: ICD-10-CM

## 2023-02-28 DIAGNOSIS — R39.12 WEAK URINARY STREAM: Primary | ICD-10-CM

## 2023-02-28 LAB — URINE VOLUME: 0

## 2023-02-28 PROCEDURE — 51798 US URINE CAPACITY MEASURE: CPT | Performed by: NURSE PRACTITIONER

## 2023-02-28 PROCEDURE — 99212 OFFICE O/P EST SF 10 MIN: CPT | Performed by: NURSE PRACTITIONER

## 2023-03-06 ENCOUNTER — OFFICE VISIT (OUTPATIENT)
Dept: SURGERY | Facility: CLINIC | Age: 58
End: 2023-03-06
Payer: COMMERCIAL

## 2023-03-06 VITALS — BODY MASS INDEX: 36.12 KG/M2 | WEIGHT: 296.6 LBS | HEIGHT: 76 IN

## 2023-03-06 DIAGNOSIS — Z09 ENCOUNTER FOR FOLLOW-UP: Primary | ICD-10-CM

## 2023-03-06 PROCEDURE — 99024 POSTOP FOLLOW-UP VISIT: CPT | Performed by: SURGERY

## 2023-03-06 NOTE — PROGRESS NOTES
Patient is here for follow-up after removed a scalp lipoma.  No complaints or concerns.  Incision is healing fine.  I removed the staples today.  Patient seems pleased with his outcome thus far.  No new issues to address.  Patient can see me PRN.

## 2023-03-21 ENCOUNTER — OFFICE VISIT (OUTPATIENT)
Dept: INTERNAL MEDICINE | Facility: CLINIC | Age: 58
End: 2023-03-21
Payer: COMMERCIAL

## 2023-03-21 VITALS
TEMPERATURE: 97.3 F | SYSTOLIC BLOOD PRESSURE: 142 MMHG | WEIGHT: 291 LBS | HEART RATE: 90 BPM | HEIGHT: 76 IN | DIASTOLIC BLOOD PRESSURE: 90 MMHG | OXYGEN SATURATION: 95 % | BODY MASS INDEX: 35.44 KG/M2

## 2023-03-21 DIAGNOSIS — J22 LOWER RESPIRATORY INFECTION (E.G., BRONCHITIS, PNEUMONIA, PNEUMONITIS, PULMONITIS): ICD-10-CM

## 2023-03-21 DIAGNOSIS — K76.9 LIVER LESION: Primary | ICD-10-CM

## 2023-03-21 DIAGNOSIS — N28.9 RENAL LESION: ICD-10-CM

## 2023-03-21 DIAGNOSIS — R91.1 LUNG NODULE: ICD-10-CM

## 2023-03-21 RX ORDER — PREDNISONE 20 MG/1
TABLET ORAL
Qty: 10 TABLET | Refills: 0 | Status: SHIPPED | OUTPATIENT
Start: 2023-03-21

## 2023-03-21 RX ORDER — AZITHROMYCIN 250 MG/1
TABLET, FILM COATED ORAL
Qty: 6 TABLET | Refills: 0 | Status: SHIPPED | OUTPATIENT
Start: 2023-03-21

## 2023-03-21 RX ORDER — BENZONATATE 200 MG/1
200 CAPSULE ORAL 3 TIMES DAILY PRN
Qty: 30 CAPSULE | Refills: 0 | Status: SHIPPED | OUTPATIENT
Start: 2023-03-21

## 2023-03-21 NOTE — PROGRESS NOTES
Chief Complaint  Hospital Follow Up Visit (Patient has nodules on lung, lesions on kidney and liver, and prostate was a little high. )  Subjective      History of Present Illness  Alli Brower is a 57 y.o. male who presents to [facility] for complaint of follow up from [facility] ER on 2/13/2023 for diarrhea, cough, and abdominal cramping of 3 weeks. ER doctor thought diarrhea was from Wegovy. Patient stop taking Wegovy at this time. Reports improvement of diarrhea. Today had a solid normal BM. Positive cough, pluric chest pain, dizziness, wheezing, nausea and diarrhea. Complaining of productive cough for 3 weeks. White thick sputum. OTC mucinex, DayQuil, zyrtec with minimal improvement. CT  Abdomen Pelvis showed 8 mm indeterminate pulmonary nodule seen posteriorly within the right lower lobe. The liver is enlarged, with the right hepatic lobe measuring 21.5 cm long. Indeterminate 9 mm hyperattenuation lesion within the superoanterior portion of the left hepatic lobe. Indeterminate small left renal lesion, with 2 small to characterize tiny right renal lesions. Recommend further evaluation with abdomen MRI performed with or without contrast. Recommend follow-up chest CT in 6 months for further evaluation.    Review of Systems  Constitutional: Negative for loss of appetite, fever and chills.   Lymphatic: Negative for painful and swollen nodes.   HEENT: Negative for sinus pain and earache.   Cardiovascular: Negative for  palpitations and edema.   Respiratory: Negative for  dyspnea.   Gastrointestinal: Negative  vomiting    Integumentary: Negative for rash.   Musculoskeletal: Negative for myalgia.   Neurologic: Negative for headaches.     Past Medical History:   Diagnosis Date   • COPD (chronic obstructive pulmonary disease) (HCC)    • Gout    • H/O cold sores    • Renal disorder         Past Surgical History:   Procedure Laterality Date   • COLONOSCOPY  12/05/2019   • LIPOMA EXCISION   "2010    removed from back        No Known Allergies       Current Outpatient Medications:   •  allopurinol (ZYLOPRIM) 300 MG tablet, Take 1 tablet by mouth Daily., Disp: 90 tablet, Rfl: 3  •  colchicine 0.6 MG tablet, Take 1 tablet by mouth Daily., Disp: 30 tablet, Rfl: 5  •  tamsulosin (FLOMAX) 0.4 MG capsule 24 hr capsule, Take 1 capsule by mouth Daily., Disp: 30 capsule, Rfl: 1  •  valACYclovir (VALTREX) 500 MG tablet, Take 1 tablet by mouth Daily., Disp: 90 tablet, Rfl: 3  •  azithromycin (Zithromax Z-Jadiel) 250 MG tablet, Use as directed., Disp: 6 tablet, Rfl: 0  •  benzonatate (TESSALON) 200 MG capsule, Take 1 capsule by mouth 3 (Three) Times a Day As Needed for Cough., Disp: 30 capsule, Rfl: 0  •  predniSONE (DELTASONE) 20 MG tablet, Take 2 tabs each morning with food for 5 days., Disp: 10 tablet, Rfl: 0  •  Semaglutide-Weight Management 1 MG/0.5ML solution auto-injector, Inject 1 mg under the skin into the appropriate area as directed 1 (One) Time Per Week. (Patient not taking: Reported on 3/21/2023), Disp: 2 mL, Rfl: 0    Objective   /90 (BP Location: Right arm, Patient Position: Sitting, Cuff Size: Large Adult)   Pulse 90   Temp 97.3 °F (36.3 °C) (Temporal)   Ht 193 cm (75.98\")   Wt 132 kg (291 lb)   SpO2 95%   BMI 35.44 kg/m²    Estimated body mass index is 35.44 kg/m² as calculated from the following:    Height as of this encounter: 193 cm (75.98\").    Weight as of this encounter: 132 kg (291 lb).   Physical Exam  HENT:      Head: Normocephalic and atraumatic.      Mouth/Throat:      Mouth: Mucous membranes are moist.   Eyes:      Conjunctiva/sclera: Conjunctivae normal.   Cardiovascular:      Rate and Rhythm: Normal rate and regular rhythm.      Heart sounds: Normal heart sounds.   Pulmonary:      Effort: Pulmonary effort is normal.      Breath sounds: Examination of the right-upper field reveals rhonchi. Examination of the left-upper field reveals rhonchi. Examination of the right-middle " field reveals rhonchi. Examination of the right-lower field reveals rhonchi. Examination of the left-lower field reveals rhonchi. Rhonchi present.   Skin:     General: Skin is warm and dry.   Neurological:      Mental Status: He is alert and oriented to person, place, and time.   Psychiatric:         Thought Content: Thought content normal.          Assessment and Plan   Diagnoses and all orders for this visit:    1. Liver lesion (Primary)  Comments:  Patient was referred to Dr. Hebert at [facility].  I will follow along with his care.  Orders:  -     Ambulatory Referral to Gastroenterology    2. Renal lesion  Comments:  We will reassess after patient has work-up with liver lesion.    3. Lower respiratory infection (e.g., bronchitis, pneumonia, pneumonitis, pulmonitis)  Comments:  Start Prednisone 40 mg X 5 days. Start Azithromycin (Z pack). Increase fluids.    4. Lung nodule  Comments:   Follow-up chest CT in 6 months for further evaluation.        Other orders  -     predniSONE (DELTASONE) 20 MG tablet; Take 2 tabs each morning with food for 5 days.  Dispense: 10 tablet; Refill: 0  -     azithromycin (Zithromax Z-Jadiel) 250 MG tablet; Use as directed.  Dispense: 6 tablet; Refill: 0  -     benzonatate (TESSALON) 200 MG capsule; Take 1 capsule by mouth 3 (Three) Times a Day As Needed for Cough.  Dispense: 30 capsule; Refill: 0        Education on diagnosis, medication and treatment plan.    Patient was given instructions and counseling regarding his condition. Please see specific information pulled into the AVS if appropriate.     Follow Up   Return if symptoms worsen or fail to improve, for Next scheduled follow up.    APPLE Madrid

## 2023-03-30 ENCOUNTER — TELEPHONE (OUTPATIENT)
Dept: INTERNAL MEDICINE | Facility: CLINIC | Age: 58
End: 2023-03-30
Payer: COMMERCIAL

## 2023-03-30 DIAGNOSIS — K76.9 LIVER LESION: Primary | ICD-10-CM

## 2023-03-30 NOTE — TELEPHONE ENCOUNTER
Patients significant other called, they cannot get into Dr. Hebert until November. They would like another recommendation for a gastroenterologist in [place].    Monica, ([phone])

## 2023-03-30 NOTE — TELEPHONE ENCOUNTER
Pt is asking for a referral to Nae[facility] ( Hematology ) , Fax number . Call back Monica Martinez at .

## 2023-04-03 ENCOUNTER — TELEPHONE (OUTPATIENT)
Dept: INTERNAL MEDICINE | Facility: CLINIC | Age: 58
End: 2023-04-03

## 2023-04-03 NOTE — TELEPHONE ENCOUNTER
Caller: Monica Martinez    Relationship: Emergency Contact NOT ON VERBAL    Best call back number:    [phone]      What is the best time to reach you: ANY    Who are you requesting to speak with (clinical staff, provider,  specific staff member): ANY    What was the call regarding: PATIENT'S FIANCE WOULD LIKE AN UPDATE ON HIS REFERRAL TO HEMATOLOGY.     Do you require a callback: YES

## 2023-04-04 RX ORDER — TAMSULOSIN HYDROCHLORIDE 0.4 MG/1
1 CAPSULE ORAL DAILY
Qty: 30 CAPSULE | Refills: 1 | Status: SHIPPED | OUTPATIENT
Start: 2023-04-04

## 2023-04-04 RX ORDER — TAMSULOSIN HYDROCHLORIDE 0.4 MG/1
1 CAPSULE ORAL DAILY
Qty: 90 CAPSULE | OUTPATIENT
Start: 2023-04-04

## 2023-04-10 NOTE — TELEPHONE ENCOUNTER
Caller: Monica Martinez    Relationship: Emergency Contact    Best call back number: [phone]    What specialty or service is being requested: HEMATOLOGY    What is the provider, practice or medical service name: DR. FAROOQ    Any additional details: PATIENT'S SIGNIFICANT OTHER IS CALLING TO CHECK REFERRAL STATUS TO DR. FAROOQ WITH HEMATOLOGY.   SHE IS REQUESTING CALL WITH UPDATE TODAY, 04/10/2023.

## 2023-04-10 NOTE — TELEPHONE ENCOUNTER
Spoke with patient wife to informed her that referral was faxed, she stated they told her they haven't receive the referral yet. I called [facility] to verify they have receive referral they ask for me to refaxed it. I called them back and left a voicemail for Dr King  to call me back.

## 2023-04-11 NOTE — TELEPHONE ENCOUNTER
I have called Dr. King's office and they have received the referral and Melissa is going to call pt today and schedule this.

## 2023-04-14 ENCOUNTER — TELEPHONE (OUTPATIENT)
Dept: INTERNAL MEDICINE | Facility: CLINIC | Age: 58
End: 2023-04-14
Payer: COMMERCIAL

## 2023-04-18 ENCOUNTER — TELEPHONE (OUTPATIENT)
Dept: INTERNAL MEDICINE | Facility: CLINIC | Age: 58
End: 2023-04-18
Payer: COMMERCIAL

## 2023-04-18 NOTE — TELEPHONE ENCOUNTER
Called Monica ocampo to call us back, patient is scheduled with Dr Lester on 4/20/23 at 3:30pm at [address] [place]

## 2023-04-18 NOTE — TELEPHONE ENCOUNTER
Please call pt spouse Monica at [phone] after 2:45 in regards to the referral , Pt wife wanted to speak directly to you.

## 2023-04-24 RX ORDER — TAMSULOSIN HYDROCHLORIDE 0.4 MG/1
1 CAPSULE ORAL DAILY
Qty: 90 CAPSULE | OUTPATIENT
Start: 2023-04-24

## 2023-04-24 RX ORDER — TAMSULOSIN HYDROCHLORIDE 0.4 MG/1
1 CAPSULE ORAL DAILY
Qty: 30 CAPSULE | Refills: 1 | Status: SHIPPED | OUTPATIENT
Start: 2023-04-24

## 2023-04-24 NOTE — TELEPHONE ENCOUNTER
The patient is needing the increased dose of wegovy sent in. He also needs the tamsulosin sent in.

## 2023-04-24 NOTE — TELEPHONE ENCOUNTER
Caller: Alli Brower    Relationship: Self    Best call back number: [phone]    Requested Prescriptions:   Requested Prescriptions      No prescriptions requested or ordered in this encounter   WEGOVY 1.7 MG   tamsulosin (FLOMAX) 0.4 MG capsule 24 hr capsule    Pharmacy where request should be sent: [facility]/PHARMACY #[id number] - [place], [place] - [address] [facility] - [phone]  - [phone] FX     Last office visit with prescribing clinician: 3/21/2023   Last telemedicine visit with prescribing clinician: 7/31/2023   Next office visit with prescribing clinician: 7/31/2023     Additional details provided by patient: PATIENT STATED THAT HE IS OUT OF MEDICATIONS.     Does the patient have less than a 3 day supply:  [x] Yes  [] No    Ce Moya Rep   04/24/23 10:47 EDT

## 2023-04-25 ENCOUNTER — TELEPHONE (OUTPATIENT)
Dept: INTERNAL MEDICINE | Facility: CLINIC | Age: 58
End: 2023-04-25
Payer: COMMERCIAL

## 2023-04-25 NOTE — TELEPHONE ENCOUNTER
[facility] called and states that patient is needing the Wegovy now, the prescription that was sent in states to fill on July. Please advise.

## 2023-04-25 NOTE — TELEPHONE ENCOUNTER
[facility] stating that prescription for wegovy was stated to start in july. They are needing a new script sent in for start date of today.

## 2023-07-31 ENCOUNTER — TELEPHONE (OUTPATIENT)
Dept: INTERNAL MEDICINE | Facility: CLINIC | Age: 58
End: 2023-07-31

## 2023-07-31 ENCOUNTER — OFFICE VISIT (OUTPATIENT)
Dept: UROLOGY | Facility: CLINIC | Age: 58
End: 2023-07-31
Payer: COMMERCIAL

## 2023-07-31 VITALS — BODY MASS INDEX: 35.44 KG/M2 | RESPIRATION RATE: 16 BRPM | WEIGHT: 291 LBS | HEIGHT: 76 IN

## 2023-07-31 DIAGNOSIS — N39.43 BENIGN PROSTATIC HYPERPLASIA (BPH) WITH POST-VOID DRIBBLING: Primary | ICD-10-CM

## 2023-07-31 DIAGNOSIS — R39.12 WEAK URINARY STREAM: ICD-10-CM

## 2023-07-31 DIAGNOSIS — N40.1 BENIGN PROSTATIC HYPERPLASIA (BPH) WITH POST-VOID DRIBBLING: Primary | ICD-10-CM

## 2023-07-31 LAB — URINE VOLUME: 0

## 2023-07-31 PROCEDURE — 99213 OFFICE O/P EST LOW 20 MIN: CPT | Performed by: NURSE PRACTITIONER

## 2023-07-31 PROCEDURE — 51798 US URINE CAPACITY MEASURE: CPT | Performed by: NURSE PRACTITIONER

## 2023-08-07 ENCOUNTER — OFFICE VISIT (OUTPATIENT)
Dept: INTERNAL MEDICINE | Facility: CLINIC | Age: 58
End: 2023-08-07
Payer: COMMERCIAL

## 2023-08-07 VITALS
SYSTOLIC BLOOD PRESSURE: 116 MMHG | HEART RATE: 117 BPM | TEMPERATURE: 97.9 F | OXYGEN SATURATION: 98 % | BODY MASS INDEX: 32.45 KG/M2 | DIASTOLIC BLOOD PRESSURE: 80 MMHG | HEIGHT: 76 IN | WEIGHT: 266.5 LBS

## 2023-08-07 DIAGNOSIS — I10 ESSENTIAL HYPERTENSION: ICD-10-CM

## 2023-08-07 DIAGNOSIS — M1A.09X0 CHRONIC GOUT OF MULTIPLE SITES, UNSPECIFIED CAUSE: ICD-10-CM

## 2023-08-07 DIAGNOSIS — R73.03 PREDIABETES: ICD-10-CM

## 2023-08-07 DIAGNOSIS — Z86.19 HISTORY OF HERPES SIMPLEX INFECTION: ICD-10-CM

## 2023-08-07 DIAGNOSIS — Z00.00 ANNUAL PHYSICAL EXAM: Primary | ICD-10-CM

## 2023-08-07 DIAGNOSIS — E66.9 OBESITY (BMI 35.0-39.9 WITHOUT COMORBIDITY): ICD-10-CM

## 2023-08-07 DIAGNOSIS — R53.83 FATIGUE, UNSPECIFIED TYPE: ICD-10-CM

## 2023-08-07 DIAGNOSIS — E55.9 VITAMIN D DEFICIENCY: ICD-10-CM

## 2023-08-07 PROCEDURE — 99396 PREV VISIT EST AGE 40-64: CPT | Performed by: NURSE PRACTITIONER

## 2023-08-07 RX ORDER — PREDNISONE 20 MG/1
TABLET ORAL
Qty: 28 TABLET | Refills: 0 | Status: SHIPPED | OUTPATIENT
Start: 2023-08-07

## 2023-08-07 NOTE — PROGRESS NOTES
"Chief Complaint  Annual Exam (Physical. Patient has no complaint. Patient would like refill on prednisone for gout flair up.)  Subjective    History of Present Illness  Alli Brower is a 58 y.o. male who presents to [facility] for His annual physical exam  and follow-up of hypertension, chronic gout, history of HSV, impaired fasting glucose and vitamin D deficiency.  Negative for chest pain and heart palpitations. Positive for fatigue.  The patient is having a gout flareup and would like a prescription for steroids.  History of heart palpitations related to increased caffeine use.  He reports shortness of air from Covid illness has improved.       Current Outpatient Medications:     allopurinol (ZYLOPRIM) 300 MG tablet, Take 1 tablet by mouth Daily., Disp: 90 tablet, Rfl: 3    colchicine 0.6 MG tablet, Take 1 tablet by mouth Daily., Disp: 30 tablet, Rfl: 5    predniSONE (DELTASONE) 20 MG tablet, Take 2 tabs each morning with food., Disp: 28 tablet, Rfl: 0    [START ON 8/14/2023] Semaglutide-Weight Management 2.4 MG/0.75ML solution auto-injector, Inject 2.4 mg under the skin into the appropriate area as directed Every 7 (Seven) Days for 4 doses. Inject weekly for 4 weeks and continue this dose., Disp: 3 mL, Rfl: 0    tamsulosin (FLOMAX) 0.4 MG capsule 24 hr capsule, TAKE 1 CAPSULE BY MOUTH EVERY DAY, Disp: 30 capsule, Rfl: 1    valACYclovir (VALTREX) 500 MG tablet, Take 1 tablet by mouth Daily., Disp: 90 tablet, Rfl: 3  No Known Allergies   Past Medical History:   Diagnosis Date    COPD (chronic obstructive pulmonary disease)     Gout     H/O cold sores     Renal disorder       Past Surgical History:   Procedure Laterality Date    COLONOSCOPY  12/05/2019    LIPOMA EXCISION  2010    removed from back        Objective   /80 (BP Location: Right arm, Patient Position: Sitting, Cuff Size: Large Adult)   Pulse 117   Temp 97.9 øF (36.6 øC) (Temporal)   Ht 193 cm (75.98\")   Wt " "121 kg (266 lb 8 oz)   SpO2 98%   BMI 32.46 kg/mý    Estimated body mass index is 32.46 kg/mý as calculated from the following:    Height as of this encounter: 193 cm (75.98\").    Weight as of this encounter: 121 kg (266 lb 8 oz).     Physical Exam  Vitals reviewed.   Constitutional:       General: He is not in acute distress.  HENT:      Head: Normocephalic and atraumatic.      Right Ear: Tympanic membrane and ear canal normal.      Left Ear: Tympanic membrane and ear canal normal.   Eyes:      Conjunctiva/sclera: Conjunctivae normal.   Cardiovascular:      Rate and Rhythm: Normal rate and regular rhythm.      Heart sounds: Normal heart sounds. No murmur heard.  Pulmonary:      Effort: Pulmonary effort is normal.      Breath sounds: Normal breath sounds. No wheezing, rhonchi or rales.   Abdominal:      General: There is no distension.      Palpations: Abdomen is soft. There is no mass.      Tenderness: There is no abdominal tenderness.   Musculoskeletal:      Right lower leg: No edema.      Left lower leg: No edema.   Lymphadenopathy:      Cervical: No cervical adenopathy.   Skin:     General: Skin is warm and dry.      Coloration: Skin is not jaundiced or pale.   Neurological:      General: No focal deficit present.      Mental Status: He is alert.   Psychiatric:         Mood and Affect: Mood normal.         Thought Content: Thought content normal.                 Assessment and Plan   Diagnoses and all orders for this visit:    1. Annual physical exam (Primary)  -     Comprehensive Metabolic Panel; Future  -     CBC & Differential; Future  -     Lipid Panel; Future  -     T4, Free; Future  -     TSH; Future    2. Obesity (BMI 35.0-39.9 without comorbidity)    3. Prediabetes  -     Hemoglobin A1c; Future    4. Essential hypertension  -     Magnesium; Future    5. Chronic gout of multiple sites, unspecified cause    6. Vitamin D deficiency  -     Vitamin D,25-Hydroxy; Future    7. History of herpes simplex " infection    8. Fatigue, unspecified type  -     Vitamin B12; Future  -     Folate; Future  -     Testosterone; Future    Other orders  -     predniSONE (DELTASONE) 20 MG tablet; Take 2 tabs each morning with food.  Dispense: 28 tablet; Refill: 0      Annual exam: Discussed healthy diet, exercise, adequate sleep, cancer screening, immunizations and preventative care. Annual eye exam and twice yearly dental cleaning.     Obesity: He has lost 32 lb. Doing well on Wegovy 2.4 mg weekly and to continue.      Prediabetes: Check level.      Hypertension: Blood pressure controlled without medication.     Chronic gout: Stable on allopurinol 300  mg daily and to continue.Stable on allopurinol 300mg daily. Continue colchicine as needed and prednisone 40 mg daily with food for a gout flare.     Vitamin D deficiency: Check level.     History of HSV: Controlled on Valtrex 500 mg daily and to continue.    Patient was given instructions and counseling regarding his condition or for health maintenance advice. Please see specific information pulled into the AVS if appropriate.     Follow Up   Return in about 1 year (around 8/7/2024) for Annual physical.    APPLE Madrid

## 2023-08-18 ENCOUNTER — LAB (OUTPATIENT)
Dept: INTERNAL MEDICINE | Facility: CLINIC | Age: 58
End: 2023-08-18
Payer: COMMERCIAL

## 2023-08-18 DIAGNOSIS — M1A.09X0 CHRONIC GOUT OF MULTIPLE SITES, UNSPECIFIED CAUSE: Chronic | ICD-10-CM

## 2023-08-18 DIAGNOSIS — I10 ESSENTIAL HYPERTENSION: ICD-10-CM

## 2023-08-18 DIAGNOSIS — R73.03 PREDIABETES: ICD-10-CM

## 2023-08-18 DIAGNOSIS — R53.83 FATIGUE, UNSPECIFIED TYPE: ICD-10-CM

## 2023-08-18 DIAGNOSIS — Z00.00 ANNUAL PHYSICAL EXAM: ICD-10-CM

## 2023-08-18 DIAGNOSIS — E55.9 VITAMIN D DEFICIENCY: ICD-10-CM

## 2023-08-18 LAB
25(OH)D3 SERPL-MCNC: 35.6 NG/ML (ref 30–100)
ALBUMIN SERPL-MCNC: 4.4 G/DL (ref 3.5–5.2)
ALBUMIN/GLOB SERPL: 1.9 G/DL
ALP SERPL-CCNC: 63 U/L (ref 39–117)
ALT SERPL W P-5'-P-CCNC: 26 U/L (ref 1–41)
ANION GAP SERPL CALCULATED.3IONS-SCNC: 10.8 MMOL/L (ref 5–15)
AST SERPL-CCNC: 19 U/L (ref 1–40)
BASOPHILS # BLD AUTO: 0.05 10*3/MM3 (ref 0–0.2)
BASOPHILS NFR BLD AUTO: 0.8 % (ref 0–1.5)
BILIRUB SERPL-MCNC: 0.5 MG/DL (ref 0–1.2)
BUN SERPL-MCNC: 12 MG/DL (ref 6–20)
BUN/CREAT SERPL: 9.2 (ref 7–25)
CALCIUM SPEC-SCNC: 9.5 MG/DL (ref 8.6–10.5)
CHLORIDE SERPL-SCNC: 105 MMOL/L (ref 98–107)
CHOLEST SERPL-MCNC: 122 MG/DL (ref 0–200)
CO2 SERPL-SCNC: 26.2 MMOL/L (ref 22–29)
CREAT SERPL-MCNC: 1.31 MG/DL (ref 0.76–1.27)
DEPRECATED RDW RBC AUTO: 38.3 FL (ref 37–54)
EGFRCR SERPLBLD CKD-EPI 2021: 63.9 ML/MIN/1.73
EOSINOPHIL # BLD AUTO: 0.06 10*3/MM3 (ref 0–0.4)
EOSINOPHIL NFR BLD AUTO: 1 % (ref 0.3–6.2)
ERYTHROCYTE [DISTWIDTH] IN BLOOD BY AUTOMATED COUNT: 12.6 % (ref 12.3–15.4)
FOLATE SERPL-MCNC: 13.3 NG/ML (ref 4.78–24.2)
GLOBULIN UR ELPH-MCNC: 2.3 GM/DL
GLUCOSE SERPL-MCNC: 87 MG/DL (ref 65–99)
HBA1C MFR BLD: 6.2 % (ref 4.8–5.6)
HCT VFR BLD AUTO: 46.5 % (ref 37.5–51)
HDLC SERPL-MCNC: 36 MG/DL (ref 40–60)
HGB BLD-MCNC: 15.2 G/DL (ref 13–17.7)
IMM GRANULOCYTES # BLD AUTO: 0.02 10*3/MM3 (ref 0–0.05)
IMM GRANULOCYTES NFR BLD AUTO: 0.3 % (ref 0–0.5)
LDLC SERPL CALC-MCNC: 69 MG/DL (ref 0–100)
LDLC/HDLC SERPL: 1.92 {RATIO}
LYMPHOCYTES # BLD AUTO: 1.65 10*3/MM3 (ref 0.7–3.1)
LYMPHOCYTES NFR BLD AUTO: 27.6 % (ref 19.6–45.3)
MAGNESIUM SERPL-MCNC: 2.2 MG/DL (ref 1.6–2.6)
MCH RBC QN AUTO: 27.5 PG (ref 26.6–33)
MCHC RBC AUTO-ENTMCNC: 32.7 G/DL (ref 31.5–35.7)
MCV RBC AUTO: 84.2 FL (ref 79–97)
MONOCYTES # BLD AUTO: 0.49 10*3/MM3 (ref 0.1–0.9)
MONOCYTES NFR BLD AUTO: 8.2 % (ref 5–12)
NEUTROPHILS NFR BLD AUTO: 3.71 10*3/MM3 (ref 1.7–7)
NEUTROPHILS NFR BLD AUTO: 62.1 % (ref 42.7–76)
NRBC BLD AUTO-RTO: 0 /100 WBC (ref 0–0.2)
PLATELET # BLD AUTO: 293 10*3/MM3 (ref 140–450)
PMV BLD AUTO: 10.9 FL (ref 6–12)
POTASSIUM SERPL-SCNC: 4.7 MMOL/L (ref 3.5–5.2)
PROT SERPL-MCNC: 6.7 G/DL (ref 6–8.5)
RBC # BLD AUTO: 5.52 10*6/MM3 (ref 4.14–5.8)
SODIUM SERPL-SCNC: 142 MMOL/L (ref 136–145)
T4 FREE SERPL-MCNC: 1.2 NG/DL (ref 0.93–1.7)
TESTOST SERPL-MCNC: 406 NG/DL (ref 193–740)
TRIGL SERPL-MCNC: 85 MG/DL (ref 0–150)
TSH SERPL DL<=0.05 MIU/L-ACNC: 2.08 UIU/ML (ref 0.27–4.2)
VIT B12 BLD-MCNC: 430 PG/ML (ref 211–946)
VLDLC SERPL-MCNC: 17 MG/DL (ref 5–40)
WBC NRBC COR # BLD: 5.98 10*3/MM3 (ref 3.4–10.8)

## 2023-08-18 PROCEDURE — 84439 ASSAY OF FREE THYROXINE: CPT | Performed by: NURSE PRACTITIONER

## 2023-08-18 PROCEDURE — 82607 VITAMIN B-12: CPT | Performed by: NURSE PRACTITIONER

## 2023-08-18 PROCEDURE — 84403 ASSAY OF TOTAL TESTOSTERONE: CPT | Performed by: NURSE PRACTITIONER

## 2023-08-18 PROCEDURE — 80061 LIPID PANEL: CPT | Performed by: NURSE PRACTITIONER

## 2023-08-18 PROCEDURE — 36415 COLL VENOUS BLD VENIPUNCTURE: CPT | Performed by: NURSE PRACTITIONER

## 2023-08-18 PROCEDURE — 82746 ASSAY OF FOLIC ACID SERUM: CPT | Performed by: NURSE PRACTITIONER

## 2023-08-18 PROCEDURE — 83735 ASSAY OF MAGNESIUM: CPT | Performed by: NURSE PRACTITIONER

## 2023-08-18 PROCEDURE — 82306 VITAMIN D 25 HYDROXY: CPT | Performed by: NURSE PRACTITIONER

## 2023-08-18 PROCEDURE — 83036 HEMOGLOBIN GLYCOSYLATED A1C: CPT | Performed by: NURSE PRACTITIONER

## 2023-08-18 PROCEDURE — 80050 GENERAL HEALTH PANEL: CPT | Performed by: NURSE PRACTITIONER

## 2023-08-18 RX ORDER — ALLOPURINOL 300 MG/1
300 TABLET ORAL DAILY
Qty: 90 TABLET | Refills: 3 | Status: SHIPPED | OUTPATIENT
Start: 2023-08-18

## 2023-08-18 NOTE — TELEPHONE ENCOUNTER
Rx Refill Note  Requested Prescriptions      No prescriptions requested or ordered in this encounter      Last office visit with prescribing clinician: 8/7/2023   Last telemedicine visit with prescribing clinician: Visit date not found   Next office visit with prescribing clinician: 2/14/2024                         Would you like a call back once the refill request has been completed: [] Yes [] No    If the office needs to give you a call back, can they leave a voicemail: [] Yes [] No    Katiana Parson MA  08/18/23, 16:09 EDT

## 2023-08-20 DIAGNOSIS — R73.03 PREDIABETES: ICD-10-CM

## 2023-08-20 DIAGNOSIS — E78.6 LOW HDL (UNDER 40): ICD-10-CM

## 2023-08-20 DIAGNOSIS — E55.9 VITAMIN D DEFICIENCY: Primary | ICD-10-CM

## 2023-08-20 DIAGNOSIS — I10 ESSENTIAL HYPERTENSION: ICD-10-CM

## 2023-08-25 RX ORDER — TAMSULOSIN HYDROCHLORIDE 0.4 MG/1
CAPSULE ORAL
Qty: 30 CAPSULE | Refills: 1 | Status: SHIPPED | OUTPATIENT
Start: 2023-08-25

## 2023-09-01 ENCOUNTER — TELEPHONE (OUTPATIENT)
Dept: INTERNAL MEDICINE | Facility: CLINIC | Age: 58
End: 2023-09-01
Payer: COMMERCIAL

## 2023-09-01 NOTE — TELEPHONE ENCOUNTER
Caller: Alli Brower    Relationship: Self    Best call back number: [phone]     Requested Prescriptions:   [facility]      Pharmacy where request should be sent: [facility]/PHARMACY #[id number] - [place], [place] - [address] - [phone]  - [phone] FX     Last office visit with prescribing clinician: 8/7/2023   Last telemedicine visit with prescribing clinician: Visit date not found   Next office visit with prescribing clinician: 2/14/2024     Additional details provided by patient: PATIENT STATED HE IS OUT OF MEDICATION     Does the patient have less than a 3 day supply:  [x] Yes  [] No    Ce Moya Rep   09/01/23 15:05 EDT

## 2023-09-07 ENCOUNTER — TELEPHONE (OUTPATIENT)
Dept: INTERNAL MEDICINE | Facility: CLINIC | Age: 58
End: 2023-09-07
Payer: COMMERCIAL

## 2023-09-07 NOTE — TELEPHONE ENCOUNTER
HUB STAFF ATTEMPTED TO FOLLOW WARM TRANSFER PROCESS AND WAS UNSUCCESSFUL.    Caller: LIN BLANKENSHIP    Relationship to patient:     Best call back number: [phone]     Patient is needing: LIN STATED THAT THE MEDICATION WEGOVY IS NOT AT THE PHARMACY [id number] - [place], [place] - [address] - [phone]  - [phone] FX     LIN STATED THAT THE PATIENT IS OUT OF THE MEDICATION.

## 2023-09-11 ENCOUNTER — TELEPHONE (OUTPATIENT)
Dept: INTERNAL MEDICINE | Facility: CLINIC | Age: 58
End: 2023-09-11
Payer: COMMERCIAL

## 2023-09-29 RX ORDER — TAMSULOSIN HYDROCHLORIDE 0.4 MG/1
CAPSULE ORAL
Qty: 30 CAPSULE | Refills: 1 | Status: SHIPPED | OUTPATIENT
Start: 2023-09-29

## 2024-01-29 ENCOUNTER — TELEPHONE (OUTPATIENT)
Dept: INTERNAL MEDICINE | Facility: CLINIC | Age: 59
End: 2024-01-29
Payer: COMMERCIAL

## 2024-01-29 DIAGNOSIS — M1A.09X0 CHRONIC GOUT OF MULTIPLE SITES, UNSPECIFIED CAUSE: Chronic | ICD-10-CM

## 2024-01-29 RX ORDER — TAMSULOSIN HYDROCHLORIDE 0.4 MG/1
1 CAPSULE ORAL DAILY
Qty: 30 CAPSULE | Refills: 1 | Status: SHIPPED | OUTPATIENT
Start: 2024-01-29 | End: 2024-01-31 | Stop reason: SDUPTHER

## 2024-01-29 RX ORDER — ALLOPURINOL 300 MG/1
300 TABLET ORAL DAILY
Qty: 90 TABLET | Refills: 3 | Status: SHIPPED | OUTPATIENT
Start: 2024-01-29

## 2024-01-29 NOTE — TELEPHONE ENCOUNTER
Caller: Alli Brower    Relationship: Self    Best call back number: [phone]     Requested Prescriptions:   Requested Prescriptions     Pending Prescriptions Disp Refills    tamsulosin (FLOMAX) 0.4 MG capsule 24 hr capsule 30 capsule 1     Sig: Take 1 capsule by mouth Daily.    allopurinol (ZYLOPRIM) 300 MG tablet 90 tablet 3     Sig: Take 1 tablet by mouth Daily.      Semaglutide-Weight Management 1.7 MG/0.75ML solution auto-injector   Pharmacy where request should be sent: [facility]/PHARMACY #[id number] - [place], [place] - [address] [facility] - [phone]  - [phone] FX     Last office visit with prescribing clinician: 8/7/2023   Last telemedicine visit with prescribing clinician: Visit date not found   Next office visit with prescribing clinician: 2/14/2024     Additional details provided by patient:     Does the patient have less than a 3 day supply:  [x] Yes  [] No    Would you like a call back once the refill request has been completed: [] Yes [x] No    If the office needs to give you a call back, can they leave a voicemail: [] Yes [x] No    Ce Laguna Rep   01/29/24 13:11 EST

## 2024-01-30 ENCOUNTER — TELEPHONE (OUTPATIENT)
Dept: INTERNAL MEDICINE | Facility: CLINIC | Age: 59
End: 2024-01-30
Payer: COMMERCIAL

## 2024-01-30 ENCOUNTER — LAB (OUTPATIENT)
Dept: LAB | Facility: HOSPITAL | Age: 59
End: 2024-01-30
Payer: COMMERCIAL

## 2024-01-30 ENCOUNTER — TELEPHONE (OUTPATIENT)
Dept: UROLOGY | Facility: CLINIC | Age: 59
End: 2024-01-30
Payer: COMMERCIAL

## 2024-01-30 DIAGNOSIS — N40.1 BENIGN PROSTATIC HYPERPLASIA (BPH) WITH POST-VOID DRIBBLING: ICD-10-CM

## 2024-01-30 DIAGNOSIS — I10 ESSENTIAL HYPERTENSION: ICD-10-CM

## 2024-01-30 DIAGNOSIS — E55.9 VITAMIN D DEFICIENCY: ICD-10-CM

## 2024-01-30 DIAGNOSIS — N39.43 BENIGN PROSTATIC HYPERPLASIA (BPH) WITH POST-VOID DRIBBLING: ICD-10-CM

## 2024-01-30 DIAGNOSIS — R73.03 PREDIABETES: ICD-10-CM

## 2024-01-30 DIAGNOSIS — E78.6 LOW HDL (UNDER 40): ICD-10-CM

## 2024-01-30 LAB
25(OH)D3 SERPL-MCNC: 30.1 NG/ML (ref 30–100)
ALBUMIN SERPL-MCNC: 4.6 G/DL (ref 3.5–5.2)
ALBUMIN/GLOB SERPL: 1.6 G/DL
ALP SERPL-CCNC: 75 U/L (ref 39–117)
ALT SERPL W P-5'-P-CCNC: 21 U/L (ref 1–41)
ANION GAP SERPL CALCULATED.3IONS-SCNC: 11.7 MMOL/L (ref 5–15)
AST SERPL-CCNC: 17 U/L (ref 1–40)
BASOPHILS # BLD AUTO: 0.08 10*3/MM3 (ref 0–0.2)
BASOPHILS NFR BLD AUTO: 0.9 % (ref 0–1.5)
BILIRUB SERPL-MCNC: 0.2 MG/DL (ref 0–1.2)
BUN SERPL-MCNC: 20 MG/DL (ref 6–20)
BUN/CREAT SERPL: 17.5 (ref 7–25)
CALCIUM SPEC-SCNC: 9.1 MG/DL (ref 8.6–10.5)
CHLORIDE SERPL-SCNC: 101 MMOL/L (ref 98–107)
CHOLEST SERPL-MCNC: 150 MG/DL (ref 0–200)
CO2 SERPL-SCNC: 28.3 MMOL/L (ref 22–29)
CREAT SERPL-MCNC: 1.14 MG/DL (ref 0.76–1.27)
DEPRECATED RDW RBC AUTO: 40.8 FL (ref 37–54)
EGFRCR SERPLBLD CKD-EPI 2021: 75.5 ML/MIN/1.73
EOSINOPHIL # BLD AUTO: 0.11 10*3/MM3 (ref 0–0.4)
EOSINOPHIL NFR BLD AUTO: 1.2 % (ref 0.3–6.2)
ERYTHROCYTE [DISTWIDTH] IN BLOOD BY AUTOMATED COUNT: 13.1 % (ref 12.3–15.4)
GLOBULIN UR ELPH-MCNC: 2.8 GM/DL
GLUCOSE SERPL-MCNC: 96 MG/DL (ref 65–99)
HBA1C MFR BLD: 6 % (ref 4.8–5.6)
HCT VFR BLD AUTO: 48 % (ref 37.5–51)
HDLC SERPL-MCNC: 37 MG/DL (ref 40–60)
HGB BLD-MCNC: 15.8 G/DL (ref 13–17.7)
IMM GRANULOCYTES # BLD AUTO: 0.03 10*3/MM3 (ref 0–0.05)
IMM GRANULOCYTES NFR BLD AUTO: 0.3 % (ref 0–0.5)
LDLC SERPL CALC-MCNC: 79 MG/DL (ref 0–100)
LDLC/HDLC SERPL: 1.96 {RATIO}
LYMPHOCYTES # BLD AUTO: 1.77 10*3/MM3 (ref 0.7–3.1)
LYMPHOCYTES NFR BLD AUTO: 20.1 % (ref 19.6–45.3)
MCH RBC QN AUTO: 27.8 PG (ref 26.6–33)
MCHC RBC AUTO-ENTMCNC: 32.9 G/DL (ref 31.5–35.7)
MCV RBC AUTO: 84.5 FL (ref 79–97)
MONOCYTES # BLD AUTO: 0.86 10*3/MM3 (ref 0.1–0.9)
MONOCYTES NFR BLD AUTO: 9.8 % (ref 5–12)
NEUTROPHILS NFR BLD AUTO: 5.96 10*3/MM3 (ref 1.7–7)
NEUTROPHILS NFR BLD AUTO: 67.7 % (ref 42.7–76)
NRBC BLD AUTO-RTO: 0 /100 WBC (ref 0–0.2)
PLATELET # BLD AUTO: 293 10*3/MM3 (ref 140–450)
PMV BLD AUTO: 10.9 FL (ref 6–12)
POTASSIUM SERPL-SCNC: 4.6 MMOL/L (ref 3.5–5.2)
PROT SERPL-MCNC: 7.4 G/DL (ref 6–8.5)
PSA SERPL-MCNC: 4.74 NG/ML (ref 0–4)
RBC # BLD AUTO: 5.68 10*6/MM3 (ref 4.14–5.8)
SODIUM SERPL-SCNC: 141 MMOL/L (ref 136–145)
TRIGL SERPL-MCNC: 202 MG/DL (ref 0–150)
TSH SERPL DL<=0.05 MIU/L-ACNC: 1.93 UIU/ML (ref 0.27–4.2)
VLDLC SERPL-MCNC: 34 MG/DL (ref 5–40)
WBC NRBC COR # BLD AUTO: 8.81 10*3/MM3 (ref 3.4–10.8)

## 2024-01-30 PROCEDURE — 85025 COMPLETE CBC W/AUTO DIFF WBC: CPT

## 2024-01-30 PROCEDURE — 82306 VITAMIN D 25 HYDROXY: CPT

## 2024-01-30 PROCEDURE — 80053 COMPREHEN METABOLIC PANEL: CPT

## 2024-01-30 PROCEDURE — 80061 LIPID PANEL: CPT

## 2024-01-30 PROCEDURE — 84153 ASSAY OF PSA TOTAL: CPT

## 2024-01-30 PROCEDURE — 36415 COLL VENOUS BLD VENIPUNCTURE: CPT

## 2024-01-30 PROCEDURE — 84443 ASSAY THYROID STIM HORMONE: CPT

## 2024-01-30 PROCEDURE — 83036 HEMOGLOBIN GLYCOSYLATED A1C: CPT

## 2024-01-30 NOTE — TELEPHONE ENCOUNTER
Patient stopped in asking about a PSA. He has an active PSA diagnostic in his labs and he will go to the hospital to have this done.      He also states he has been without ins for a number of months and has been off the Wegovy for at least 90 days but would like to get started back on it again.  [facility] [place].      Pt # [phone]

## 2024-01-30 NOTE — TELEPHONE ENCOUNTER
Called pt and asked him if he had gotten his PSA drawn, and he let me know he hadn't yet. He asked if he needed to R/S his appt and I told him no because [facility] will be on vacation for a few weeks and were pretty booked up. I asked if he could go this morning and try and get the lab drawn today in hopes we could get it tomorrow, but told him if he couldn't then he could go after his appt with [facility] tomorrow. I told him to call me back this afternoon and let me know if he wants to R/S or to keep his appt, and he verbalized understanding and said he would try and get his PSA drawn today.

## 2024-01-30 NOTE — PROGRESS NOTES
Chief Complaint: Benign Prostatic Hypertrophy    Subjective         History of Present Illness  Alli Brower is a 58 y.o. male presents to [facility] to be seen for follow-up.      Patient was previously seen by me with last visit on 7/31/2023 for BPH.  At that visit he was advised to continue with tamsulosin and come back in 6 months with a PSA prior.  His PVR was 0.  He is here for follow-up.    He had run out of tamsulosin about 1 week ago. He is going about 20-30 times per day. He was going that much even when on tamsulosin. Denies dysuria. He does report some sensitivity with intercourse at the tip of penis. He denies perineal pain. He does reports some urge incontinence at times.     He is having some difficulty with erections. He feels this may be related to his prostate symptoms.     PSA  1/30/2024 4.74  2/3/2023 3.5  12/30/2020 3.39  11/7/2019  3.07      Previous 7/31/2023:  Patient was previously seen by me with last visit on 2/28/2023 for BPH.  He had been started on tamsulosin by his PCP prior to this appointment.  He is here for follow-up.     He reports that he does at times forget to take tamsulosin. But overall sees improvement. He doesn't see a much post void dribble as he was.            Previous 2/28/2023:  Patient presents reporting he is having post void dribbling for a couple of years. He reports he does not have the same sex drive as he has had previously. He has gained weight since changing jobs- 30lbs from August to January. He is currently on Weygovy and making diet changes to see if this helps. Has stopped soda and doing a Mediterranean diet.      He was started on Tamsulosin 2 weeks ago. Previously had been on it over a year ago but stopped it due to concern that he could have side effects.      Reports going less frequently at night since starting Tamsulosin.         Post void dribbling   Frequency- every 1-2 hours   Urgency- with some leakage - rare   Nocturia  "x 2   GH- denies   Incontinence- with urgency- rare   History of  surgery- denies   Family history of  malignancy- denies   Cardiopulmonary- denies   Non-Smoker   Anticoagulants- denies     PSA  2/3/2023 3.5  12/30/2020 3.39  11/7/2019 3.07  Objective     Past Medical History:   Diagnosis Date    COPD (chronic obstructive pulmonary disease)     Gout     H/O cold sores     Renal disorder        Past Surgical History:   Procedure Laterality Date    COLONOSCOPY  12/05/2019    LIPOMA EXCISION  2010    removed from back         Current Outpatient Medications:     allopurinol (ZYLOPRIM) 300 MG tablet, Take 1 tablet by mouth Daily., Disp: 90 tablet, Rfl: 3    colchicine 0.6 MG tablet, Take 1 tablet by mouth Daily., Disp: 30 tablet, Rfl: 5    predniSONE (DELTASONE) 20 MG tablet, Take 2 tabs each morning with food., Disp: 28 tablet, Rfl: 0    tamsulosin (FLOMAX) 0.4 MG capsule 24 hr capsule, Take 2 capsules by mouth Daily for 360 days., Disp: 180 capsule, Rfl: 3    valACYclovir (VALTREX) 500 MG tablet, Take 1 tablet by mouth Daily., Disp: 90 tablet, Rfl: 3    doxycycline (VIBRAMYCIN) 100 MG capsule, Take 1 capsule by mouth 2 (Two) Times a Day for 28 days., Disp: 56 capsule, Rfl: 0    Lactobacillus (Florajen Acidophilus) capsule, Take 1 capsule by mouth Daily., Disp: 28 capsule, Rfl: 0    No Known Allergies     Family History   Problem Relation Age of Onset    No Known Problems Mother     Aortic aneurysm Father     Heart attack Paternal Uncle        Social History     Socioeconomic History    Marital status:    Tobacco Use    Smoking status: Never     Passive exposure: Past    Smokeless tobacco: Never   Vaping Use    Vaping Use: Never used   Substance and Sexual Activity    Alcohol use: Never    Drug use: Never    Sexual activity: Defer       Vital Signs:   /85 (BP Location: Left arm, Patient Position: Sitting, Cuff Size: Large Adult)   Pulse 80   Ht 193 cm (75.98\")   Wt 121 kg (266 lb 12.1 oz)   BMI " 32.48 kg/m²      Physical Exam  Vitals reviewed.   Constitutional:       Appearance: Normal appearance.   Neurological:      General: No focal deficit present.      Mental Status: He is alert and oriented to person, place, and time.   Psychiatric:         Mood and Affect: Mood normal.         Behavior: Behavior normal.          Result Review :   The following data was reviewed by: APPLE Box on 01/31/2024:  Results for orders placed or performed in visit on 01/31/24   Bladder Scan   Result Value Ref Range    Urine Volume 35       PSA          1/30/2024    16:45   PSA   PSA 4.740        Bladder Scan interpretation 01/31/2024    Estimation of residual urine via BVI 3000 Verathon Bladder Scan  MA/nurse performing: Ailyn BECKER MA  Residual Urine: 35 ml  Indication: Benign prostatic hyperplasia (BPH) with post-void dribbling    Prostatitis, unspecified prostatitis type    Elevated prostate specific antigen (PSA)   Position: Supine  Examination: Incremental scanning of the suprapubic area using 2.0 MHz transducer using copious amounts of acoustic gel.   Findings: An anechoic area was demonstrated which represented the bladder, with measurement of residual urine as noted. I inspected this myself. In that the residual urine was insignificant, refer to plan for treatment and plan necessary at this time.         Procedures        Assessment and Plan    Diagnoses and all orders for this visit:    1. Benign prostatic hyperplasia (BPH) with post-void dribbling (Primary)  -     Bladder Scan  -     tamsulosin (FLOMAX) 0.4 MG capsule 24 hr capsule; Take 2 capsules by mouth Daily for 360 days.  Dispense: 180 capsule; Refill: 3    2. Prostatitis, unspecified prostatitis type  -     doxycycline (VIBRAMYCIN) 100 MG capsule; Take 1 capsule by mouth 2 (Two) Times a Day for 28 days.  Dispense: 56 capsule; Refill: 0  -     Lactobacillus (Florajen Acidophilus) capsule; Take 1 capsule by mouth Daily.  Dispense: 28 capsule;  Refill: 0    3. Elevated prostate specific antigen (PSA)  -     PSA DIAGNOSTIC; Future    Given that he is having bothersome urinary symptoms of frequency and urgency with some incontinence, I am going to treat him for possible prostatitis.  His PSA was elevated as well.  He does understand that after treatment with the antibiotics that we will want a repeat PSA in 3 months to see if this was infection that was causing his symptoms.    If his PSA remains elevated in 3 months we will proceed with an MRI of the prostate with subsequent biopsy as needed.    I did increase his tamsulosin to 2 capsules daily to see if this will improve his symptoms.  We did discuss possible side effects.    Follow-up in 3 months or sooner for new concerns.    Follow Up   Return in about 3 months (around 4/30/2024) for with PSA prior.  Patient was given instructions and counseling regarding his condition or for health maintenance advice. Please see specific information pulled into the AVS if appropriate.         This document has been electronically signed by APPLE Box  January 31, 2024 08:47 EST

## 2024-01-31 ENCOUNTER — OFFICE VISIT (OUTPATIENT)
Dept: UROLOGY | Facility: CLINIC | Age: 59
End: 2024-01-31
Payer: COMMERCIAL

## 2024-01-31 VITALS
HEART RATE: 80 BPM | DIASTOLIC BLOOD PRESSURE: 85 MMHG | BODY MASS INDEX: 32.48 KG/M2 | WEIGHT: 266.76 LBS | SYSTOLIC BLOOD PRESSURE: 127 MMHG | HEIGHT: 76 IN

## 2024-01-31 DIAGNOSIS — N40.1 BENIGN PROSTATIC HYPERPLASIA (BPH) WITH POST-VOID DRIBBLING: Primary | ICD-10-CM

## 2024-01-31 DIAGNOSIS — R97.20 ELEVATED PROSTATE SPECIFIC ANTIGEN (PSA): ICD-10-CM

## 2024-01-31 DIAGNOSIS — N39.43 BENIGN PROSTATIC HYPERPLASIA (BPH) WITH POST-VOID DRIBBLING: Primary | ICD-10-CM

## 2024-01-31 DIAGNOSIS — N41.9 PROSTATITIS, UNSPECIFIED PROSTATITIS TYPE: ICD-10-CM

## 2024-01-31 LAB — URINE VOLUME: 35

## 2024-01-31 RX ORDER — DOXYCYCLINE HYCLATE 100 MG/1
100 CAPSULE ORAL 2 TIMES DAILY
Qty: 56 CAPSULE | Refills: 0 | Status: SHIPPED | OUTPATIENT
Start: 2024-01-31 | End: 2024-02-28

## 2024-01-31 RX ORDER — TAMSULOSIN HYDROCHLORIDE 0.4 MG/1
2 CAPSULE ORAL DAILY
Qty: 180 CAPSULE | Refills: 3 | Status: SHIPPED | OUTPATIENT
Start: 2024-01-31 | End: 2025-01-25

## 2024-01-31 RX ORDER — LACTOBACILLUS ACIDOPHILUS 20B CELL
1 CAPSULE ORAL DAILY
Qty: 28 CAPSULE | Refills: 0 | Status: SHIPPED | OUTPATIENT
Start: 2024-01-31

## 2024-02-14 ENCOUNTER — OFFICE VISIT (OUTPATIENT)
Dept: INTERNAL MEDICINE | Facility: CLINIC | Age: 59
End: 2024-02-14
Payer: COMMERCIAL

## 2024-02-14 VITALS
TEMPERATURE: 97 F | HEIGHT: 76 IN | WEIGHT: 275 LBS | HEART RATE: 78 BPM | DIASTOLIC BLOOD PRESSURE: 70 MMHG | SYSTOLIC BLOOD PRESSURE: 115 MMHG | OXYGEN SATURATION: 97 % | BODY MASS INDEX: 33.49 KG/M2

## 2024-02-14 DIAGNOSIS — H57.9 VISUAL COMPLAINT: ICD-10-CM

## 2024-02-14 DIAGNOSIS — Z86.19 HISTORY OF HERPES SIMPLEX INFECTION: ICD-10-CM

## 2024-02-14 DIAGNOSIS — E55.9 VITAMIN D DEFICIENCY: ICD-10-CM

## 2024-02-14 DIAGNOSIS — M1A.09X0 CHRONIC GOUT OF MULTIPLE SITES, UNSPECIFIED CAUSE: ICD-10-CM

## 2024-02-14 DIAGNOSIS — R97.20 ELEVATED PSA: ICD-10-CM

## 2024-02-14 DIAGNOSIS — I10 ESSENTIAL HYPERTENSION: ICD-10-CM

## 2024-02-14 DIAGNOSIS — R73.03 PREDIABETES: ICD-10-CM

## 2024-02-14 DIAGNOSIS — E66.09 CLASS 1 OBESITY DUE TO EXCESS CALORIES WITH SERIOUS COMORBIDITY AND BODY MASS INDEX (BMI) OF 33.0 TO 33.9 IN ADULT: ICD-10-CM

## 2024-02-14 DIAGNOSIS — Z00.00 ANNUAL PHYSICAL EXAM: Primary | ICD-10-CM

## 2024-02-14 PROCEDURE — 99396 PREV VISIT EST AGE 40-64: CPT | Performed by: NURSE PRACTITIONER

## 2024-02-14 RX ORDER — COLCHICINE 0.6 MG/1
0.6 TABLET ORAL DAILY
Qty: 30 TABLET | Refills: 5 | Status: CANCELLED | OUTPATIENT
Start: 2024-02-14

## 2024-02-14 RX ORDER — ALLOPURINOL 300 MG/1
300 TABLET ORAL DAILY
Qty: 90 TABLET | Refills: 3 | Status: SHIPPED | OUTPATIENT
Start: 2024-02-14

## 2024-02-14 RX ORDER — VALACYCLOVIR HYDROCHLORIDE 500 MG/1
500 TABLET, FILM COATED ORAL DAILY
Qty: 90 TABLET | Refills: 3 | Status: SHIPPED | OUTPATIENT
Start: 2024-02-14

## 2024-03-01 ENCOUNTER — TELEPHONE (OUTPATIENT)
Dept: INTERNAL MEDICINE | Facility: CLINIC | Age: 59
End: 2024-03-01
Payer: COMMERCIAL

## 2024-03-01 NOTE — TELEPHONE ENCOUNTER
Caller: Alli Brower    Relationship: Self    Best call back number: [phone]     What medication are you requesting: PREDNISONE    What are your current symptoms: GOUT ATTACK    How long have you been experiencing symptoms: A WEEK    Have you had these symptoms before:    [x] Yes  [] No    Have you been treated for these symptoms before:   [x] Yes  [] No    If a prescription is needed, what is your preferred pharmacy and phone number: [id number] - [place], [place] - [address] AT [facility] [phone] [facility] [phone]      Additional notes:  PATIENT STATES THAT HE STILL HAS SOME COLCHICINE BUT NO PREDNISONE AT THIS TIME.

## 2024-03-03 RX ORDER — PREDNISONE 20 MG/1
TABLET ORAL
Qty: 14 TABLET | Refills: 0 | Status: SHIPPED | OUTPATIENT
Start: 2024-03-03

## 2024-03-07 ENCOUNTER — TELEPHONE (OUTPATIENT)
Dept: UROLOGY | Facility: CLINIC | Age: 59
End: 2024-03-07
Payer: COMMERCIAL

## 2024-03-07 NOTE — TELEPHONE ENCOUNTER
"  Caller: RICHARD CAMARILLO    Relationship: SELF    Best call back number: [phone]    What is the best time to reach you: ANYTIME    Who are you requesting to speak with (clinical staff, provider,  specific staff member): MELISSA VELÁSQUEZ    Do you know the name of the person who called: N/A    What was the call regarding: PT WANTS TO KNOW IF HE NEEDS TO COME IN SOONER THAN APRIL SINCE HE FINISHED HIS \"DOX-CYCLING\" TODAY AND SAID HE WANTS TO STAY ON TOP OF EVERYTHING.    Is it okay if the provider responds through MyChart: N/A  "

## 2024-03-08 NOTE — TELEPHONE ENCOUNTER
Left message for patient.  Informed patient that Daja does not need to see him sooner than his scheduled appt.  Advised him to call the office if he needed anything else.

## 2024-04-26 NOTE — PROGRESS NOTES
Chief Complaint: Benign prostatic hyperplasia (BPH) with post-void dribbling    Subjective         History of Present Illness  Alli Brower is a 58 y.o. male presents to [facility] to be seen for follow-up.    Patient was previously seen by me with last visit on 1/31/2024 for BPH.  At that visit he did have bothersome urinary symptoms and his PSA had gone up so we did treat him with a 1 month course of antibiotics as well as increase his tamsulosin to 2 capsules daily.  He was advised to follow-up in 3 months with a PSA prior.  He is here for follow-up.        PSA  4/29/2024 4.82    Previous 1/31/2024:  Patient was previously seen by me with last visit on 7/31/2023 for BPH.  At that visit he was advised to continue with tamsulosin and come back in 6 months with a PSA prior.  His PVR was 0.  He is here for follow-up.     He had run out of tamsulosin about 1 week ago. He is going about 20-30 times per day. He was going that much even when on tamsulosin. Denies dysuria. He does report some sensitivity with intercourse at the tip of penis. He denies perineal pain. He does report some urge incontinence at times.      He is having some difficulty with erections. He feels this may be related to his prostate symptoms.      PSA  1/30/2024 4.74  2/3/2023 3.5  12/30/2020 3.39  11/7/2019  3.07        Previous 7/31/2023:  Patient was previously seen by me with last visit on 2/28/2023 for BPH.  He had been started on tamsulosin by his PCP prior to this appointment.  He is here for follow-up.     He reports that he does at times forget to take tamsulosin. But overall sees improvement. He doesn't see a much post void dribble as he was.            Previous 2/28/2023:  Patient presents reporting he is having post void dribbling for a couple of years. He reports he does not have the same sex drive as he has had previously. He has gained weight since changing jobs- 30lbs from August to January. He is  currently on Weygovy and making diet changes to see if this helps. Has stopped soda and doing a Mediterranean diet.      He was started on Tamsulosin 2 weeks ago. Previously had been on it over a year ago but stopped it due to concern that he could have side effects.      Reports going less frequently at night since starting Tamsulosin.         Post void dribbling   Frequency- every 1-2 hours   Urgency- with some leakage - rare   Nocturia x 2   GH- denies   Incontinence- with urgency- rare   History of  surgery- denies   Family history of  malignancy- denies   Cardiopulmonary- denies   Non-Smoker   Anticoagulants- denies     PSA  2/3/2023 3.5  12/30/2020 3.39  11/7/2019 3.07         Objective     Past Medical History:   Diagnosis Date    COPD (chronic obstructive pulmonary disease)     Gout     H/O cold sores     Renal disorder        Past Surgical History:   Procedure Laterality Date    COLONOSCOPY  12/05/2019    LIPOMA EXCISION  2010    removed from back         Current Outpatient Medications:     allopurinol (ZYLOPRIM) 300 MG tablet, Take 1 tablet by mouth Daily., Disp: 90 tablet, Rfl: 3    tamsulosin (FLOMAX) 0.4 MG capsule 24 hr capsule, Take 2 capsules by mouth Daily for 360 days., Disp: 180 capsule, Rfl: 3    valACYclovir (VALTREX) 500 MG tablet, Take 1 tablet by mouth Daily., Disp: 90 tablet, Rfl: 3    colchicine 0.6 MG tablet, Take 1 tablet by mouth Daily. (Patient not taking: Reported on 4/30/2024), Disp: 30 tablet, Rfl: 5    HYDROcodone-acetaminophen (NORCO) 5-325 MG per tablet, TAKE 1 TABLET BY MOUTH EVERY 6 HOURS AS NEEDED FOR PAIN FOR 3 DAYS (Patient not taking: Reported on 4/30/2024), Disp: , Rfl:     ibuprofen (ADVIL,MOTRIN) 600 MG tablet, Take 1 tablet by mouth Every 6 (Six) Hours. (Patient not taking: Reported on 4/30/2024), Disp: , Rfl:     Lactobacillus (Florajen Acidophilus) capsule, Take 1 capsule by mouth Daily. (Patient not taking: Reported on 4/30/2024), Disp: 28 capsule, Rfl: 0     "methocarbamol (ROBAXIN) 500 MG tablet, TAKE 1 TABLET BY MOUTH FOUR TIMES A DAY FOR 3 DAYS (Patient not taking: Reported on 4/30/2024), Disp: , Rfl:     predniSONE (DELTASONE) 20 MG tablet, Take 2 tabs each morning with food for 7 days. (Patient not taking: Reported on 4/30/2024), Disp: 14 tablet, Rfl: 0    No Known Allergies     Family History   Problem Relation Age of Onset    No Known Problems Mother     Aortic aneurysm Father     Heart attack Paternal Uncle        Social History     Socioeconomic History    Marital status:    Tobacco Use    Smoking status: Never     Passive exposure: Past    Smokeless tobacco: Never   Vaping Use    Vaping status: Never Used   Substance and Sexual Activity    Alcohol use: Never    Drug use: Never    Sexual activity: Defer       Vital Signs:   /79 (BP Location: Left arm, Patient Position: Sitting, Cuff Size: Large Adult)   Pulse 101   Ht 193 cm (75.98\")   Wt 119 kg (263 lb)   BMI 32.03 kg/m²      Physical Exam  Vitals reviewed.   Constitutional:       Appearance: Normal appearance.   Neurological:      General: No focal deficit present.      Mental Status: He is alert and oriented to person, place, and time.   Psychiatric:         Mood and Affect: Mood normal.         Behavior: Behavior normal.          Result Review :   The following data was reviewed by: APPLE Box on 04/30/2024:  Results for orders placed or performed in visit on 04/30/24   Bladder Scan   Result Value Ref Range    Urine Volume 0       PSA          1/30/2024    16:45 4/29/2024    06:51   PSA   PSA 4.740  4.820          Bladder Scan interpretation 04/30/2024    Estimation of residual urine via BVI 3000 Verathon Bladder Scan  MA/nurse performing: Ailyn BECKER MA  Residual Urine: 0 ml  Indication: Benign prostatic hyperplasia (BPH) with post-void dribbling    Elevated prostate specific antigen (PSA)   Position: Supine  Examination: Incremental scanning of the suprapubic area using " 2.0 MHz transducer using copious amounts of acoustic gel.   Findings: An anechoic area was demonstrated which represented the bladder, with measurement of residual urine as noted. I inspected this myself. In that the residual urine was stable or insignificant, refer to plan for treatment and plan necessary at this time.           Procedures        Assessment and Plan    Diagnoses and all orders for this visit:    1. Benign prostatic hyperplasia (BPH) with post-void dribbling (Primary)  -     Bladder Scan    2. Elevated prostate specific antigen (PSA)  -     Cancel: MRI Prostate With & Without Contrast; Future  -     MRI Prostate With & Without Contrast; Future    Given that his PSA continues to climb, we will go ahead and order an MRI of the prostate to delineate any underlying etiology of elevated PSA and potentially provide mapping for fusion biopsy.    I will see him back 1 week after the MRI to go over the results And plan of care for management.    Follow Up   Return for 1 week after MRI.  Patient was given instructions and counseling regarding his condition or for health maintenance advice. Please see specific information pulled into the AVS if appropriate.         This document has been electronically signed by APPLE Box  April 30, 2024 15:29 EDT

## 2024-04-29 ENCOUNTER — LAB (OUTPATIENT)
Dept: LAB | Facility: HOSPITAL | Age: 59
End: 2024-04-29
Payer: COMMERCIAL

## 2024-04-29 DIAGNOSIS — R97.20 ELEVATED PROSTATE SPECIFIC ANTIGEN (PSA): ICD-10-CM

## 2024-04-29 LAB — PSA SERPL-MCNC: 4.82 NG/ML (ref 0–4)

## 2024-04-29 PROCEDURE — 84153 ASSAY OF PSA TOTAL: CPT

## 2024-04-29 PROCEDURE — 36415 COLL VENOUS BLD VENIPUNCTURE: CPT

## 2024-04-30 ENCOUNTER — OFFICE VISIT (OUTPATIENT)
Dept: UROLOGY | Facility: CLINIC | Age: 59
End: 2024-04-30
Payer: COMMERCIAL

## 2024-04-30 VITALS
BODY MASS INDEX: 32.03 KG/M2 | SYSTOLIC BLOOD PRESSURE: 137 MMHG | WEIGHT: 263 LBS | HEART RATE: 101 BPM | DIASTOLIC BLOOD PRESSURE: 79 MMHG | HEIGHT: 76 IN

## 2024-04-30 DIAGNOSIS — N39.43 BENIGN PROSTATIC HYPERPLASIA (BPH) WITH POST-VOID DRIBBLING: Primary | ICD-10-CM

## 2024-04-30 DIAGNOSIS — N40.1 BENIGN PROSTATIC HYPERPLASIA (BPH) WITH POST-VOID DRIBBLING: Primary | ICD-10-CM

## 2024-04-30 DIAGNOSIS — R97.20 ELEVATED PROSTATE SPECIFIC ANTIGEN (PSA): ICD-10-CM

## 2024-04-30 LAB — URINE VOLUME: 0

## 2024-04-30 RX ORDER — HYDROCODONE BITARTRATE AND ACETAMINOPHEN 5; 325 MG/1; MG/1
TABLET ORAL
COMMUNITY
Start: 2024-03-20

## 2024-04-30 RX ORDER — IBUPROFEN 600 MG/1
1 TABLET ORAL EVERY 6 HOURS
COMMUNITY
Start: 2024-03-20

## 2024-04-30 RX ORDER — METHOCARBAMOL 500 MG/1
TABLET, FILM COATED ORAL
COMMUNITY
Start: 2024-03-20

## 2024-05-01 ENCOUNTER — TELEPHONE (OUTPATIENT)
Dept: UROLOGY | Facility: CLINIC | Age: 59
End: 2024-05-01
Payer: COMMERCIAL

## 2024-05-01 NOTE — TELEPHONE ENCOUNTER
PATIENT CALLED AND WANTS HIS MRI RESCHEDULED TO [facility] IN [place], INSTEAD OF [facility] [place].

## 2024-05-01 NOTE — TELEPHONE ENCOUNTER
If pt calls back about when his MRI is it's now scheduled at [facility] location on 5/29/24 @ 5:00 pm and as we discussed I have mailed the appointment reminder for MRI and his F/U with Daja out today, that he should have it by Monday at the latest.

## 2024-05-02 ENCOUNTER — TELEPHONE (OUTPATIENT)
Dept: INTERNAL MEDICINE | Age: 59
End: 2024-05-02
Payer: COMMERCIAL

## 2024-05-03 NOTE — TELEPHONE ENCOUNTER
Patient called stating he would like to change his appointment time for MRI to the 24 th in the morning as was discussed.

## 2024-05-24 ENCOUNTER — TELEPHONE (OUTPATIENT)
Dept: INTERNAL MEDICINE | Age: 59
End: 2024-05-24
Payer: COMMERCIAL

## 2024-05-24 NOTE — TELEPHONE ENCOUNTER
The patient came into the office and stated he was not able to achieve an erection yesterday. He states he would like to have Viagra for a backup. He also wanted to mention that since being off wegovy his prostate numbers have been climbing. He wonders if it had anything top do with stopping the wegovy. He states he is cutting back on sugars and sticking to a diet. He wanted to run that by you and see what you think.

## 2024-06-05 ENCOUNTER — TELEPHONE (OUTPATIENT)
Dept: UROLOGY | Facility: CLINIC | Age: 59
End: 2024-06-05

## 2024-06-05 NOTE — TELEPHONE ENCOUNTER
PATIENT CALLED AND SAID HE HAD TO CANCEL HIS APPOINTMENT TODAY, DUE TO WORK.  HE WANTS TO KNOW IF HIS MRI RESULTS CAN BE UPLOADED TO MY CHART.    HE GAVE ME VERBAL PERMISSION TO SPEAK TO HIS SIGNIFICANT OTHER, LIN KRZYSZTOF.  HE REMOVED HIS MOTHER, RENE BARONE FROM VERBAL RELEASE.    #[phone]

## 2024-06-06 DIAGNOSIS — R97.20 ELEVATED PROSTATE SPECIFIC ANTIGEN (PSA): Primary | ICD-10-CM

## 2024-06-06 NOTE — TELEPHONE ENCOUNTER
Patients significant other Monica Martinez called and states patient tried to call and find out what his results were. Monica Martinez is on his VELMA. Please advise!

## 2024-06-06 NOTE — TELEPHONE ENCOUNTER
Patient's significant other, Monica Martinez, called back. She is on his VELMA form. I advised that a [employer] message was sent to the patient and offered to read the message to her as she was not with him at the moment. Message was given to the patient's significant other and she v/u. She would like to go ahead and cancel the appointment scheduled for 6/25 and reschedule for some time in the next 6 months as you suggested. She states that he would prefer a day with the earliest appointment time available. Mondays and Fridays are usually harder for him to make it to an appointment so any day other than those. Patient's SO was also advised that there will be an appointment reminder and PSA order mailed to them as well and to have the PSA done no less than 1 week prior to his appointment.

## 2024-06-06 NOTE — TELEPHONE ENCOUNTER
Left message for patient to return my call. If he calls when I am not available, you can let him know that his MRI doesn't show any concerning lesions. We can repeat his PSA in 6 months with a f/u appt after this. We can cancel the 6/25 appt and reschedule.

## 2024-10-08 ENCOUNTER — TELEPHONE (OUTPATIENT)
Dept: INTERNAL MEDICINE | Age: 59
End: 2024-10-08

## 2024-10-08 ENCOUNTER — OFFICE VISIT (OUTPATIENT)
Dept: INTERNAL MEDICINE | Age: 59
End: 2024-10-08
Payer: COMMERCIAL

## 2024-10-08 VITALS
TEMPERATURE: 97.3 F | OXYGEN SATURATION: 97 % | WEIGHT: 263 LBS | HEIGHT: 76 IN | HEART RATE: 86 BPM | SYSTOLIC BLOOD PRESSURE: 130 MMHG | DIASTOLIC BLOOD PRESSURE: 90 MMHG | BODY MASS INDEX: 32.03 KG/M2

## 2024-10-08 DIAGNOSIS — R51.9 LEFT FACIAL PAIN: ICD-10-CM

## 2024-10-08 DIAGNOSIS — I10 ESSENTIAL HYPERTENSION: ICD-10-CM

## 2024-10-08 DIAGNOSIS — E55.9 VITAMIN D DEFICIENCY: ICD-10-CM

## 2024-10-08 DIAGNOSIS — R20.0 BILATERAL HAND NUMBNESS: ICD-10-CM

## 2024-10-08 DIAGNOSIS — Z82.49 FAMILY HISTORY OF AORTIC ANEURYSM: ICD-10-CM

## 2024-10-08 DIAGNOSIS — R73.03 PREDIABETES: ICD-10-CM

## 2024-10-08 DIAGNOSIS — G47.33 OBSTRUCTIVE SLEEP APNEA SYNDROME: ICD-10-CM

## 2024-10-08 DIAGNOSIS — K05.10 GINGIVITIS: ICD-10-CM

## 2024-10-08 DIAGNOSIS — M1A.09X0 CHRONIC GOUT OF MULTIPLE SITES, UNSPECIFIED CAUSE: ICD-10-CM

## 2024-10-08 DIAGNOSIS — K42.9 UMBILICAL HERNIA WITHOUT OBSTRUCTION AND WITHOUT GANGRENE: Primary | ICD-10-CM

## 2024-10-08 DIAGNOSIS — M79.602 LEFT ARM PAIN: ICD-10-CM

## 2024-10-08 PROBLEM — M79.89 LIMB SWELLING: Status: ACTIVE | Noted: 2024-10-08

## 2024-10-08 PROBLEM — M25.569 PAIN IN JOINT, LOWER LEG: Status: ACTIVE | Noted: 2024-10-08

## 2024-10-08 PROBLEM — M25.579 PAIN IN JOINT, ANKLE AND FOOT: Status: ACTIVE | Noted: 2024-10-08

## 2024-10-08 PROBLEM — M79.603 PAIN AND NUMBNESS OF UPPER EXTREMITY: Status: ACTIVE | Noted: 2024-10-08

## 2024-10-08 PROBLEM — N20.0 KIDNEY STONES: Status: ACTIVE | Noted: 2024-10-08

## 2024-10-08 LAB
25(OH)D3 SERPL-MCNC: 28.4 NG/ML (ref 30–100)
ALBUMIN SERPL-MCNC: 4.2 G/DL (ref 3.5–5.2)
ALBUMIN/GLOB SERPL: 1.7 G/DL
ALP SERPL-CCNC: 58 U/L (ref 39–117)
ALT SERPL W P-5'-P-CCNC: 28 U/L (ref 1–41)
ANION GAP SERPL CALCULATED.3IONS-SCNC: 9.3 MMOL/L (ref 5–15)
AST SERPL-CCNC: 15 U/L (ref 1–40)
BASOPHILS # BLD AUTO: 0.04 10*3/MM3 (ref 0–0.2)
BASOPHILS NFR BLD AUTO: 0.5 % (ref 0–1.5)
BILIRUB SERPL-MCNC: 0.3 MG/DL (ref 0–1.2)
BUN SERPL-MCNC: 19 MG/DL (ref 6–20)
BUN/CREAT SERPL: 15.4 (ref 7–25)
CALCIUM SPEC-SCNC: 9.1 MG/DL (ref 8.6–10.5)
CHLORIDE SERPL-SCNC: 104 MMOL/L (ref 98–107)
CHROMATIN AB SERPL-ACNC: <10 IU/ML (ref 0–14)
CO2 SERPL-SCNC: 27.7 MMOL/L (ref 22–29)
CREAT SERPL-MCNC: 1.23 MG/DL (ref 0.76–1.27)
CRP SERPL-MCNC: <0.3 MG/DL (ref 0–0.5)
DEPRECATED RDW RBC AUTO: 41.2 FL (ref 37–54)
EGFRCR SERPLBLD CKD-EPI 2021: 68.5 ML/MIN/1.73
EOSINOPHIL # BLD AUTO: 0.12 10*3/MM3 (ref 0–0.4)
EOSINOPHIL NFR BLD AUTO: 1.6 % (ref 0.3–6.2)
ERYTHROCYTE [DISTWIDTH] IN BLOOD BY AUTOMATED COUNT: 12.7 % (ref 12.3–15.4)
FOLATE SERPL-MCNC: 10.6 NG/ML (ref 4.78–24.2)
GLOBULIN UR ELPH-MCNC: 2.5 GM/DL
GLUCOSE SERPL-MCNC: 103 MG/DL (ref 65–99)
HBA1C MFR BLD: 6.1 % (ref 4.8–5.6)
HCT VFR BLD AUTO: 46.2 % (ref 37.5–51)
HGB BLD-MCNC: 14.8 G/DL (ref 13–17.7)
IMM GRANULOCYTES # BLD AUTO: 0.04 10*3/MM3 (ref 0–0.05)
IMM GRANULOCYTES NFR BLD AUTO: 0.5 % (ref 0–0.5)
LYMPHOCYTES # BLD AUTO: 1.61 10*3/MM3 (ref 0.7–3.1)
LYMPHOCYTES NFR BLD AUTO: 21.5 % (ref 19.6–45.3)
MCH RBC QN AUTO: 28.3 PG (ref 26.6–33)
MCHC RBC AUTO-ENTMCNC: 32 G/DL (ref 31.5–35.7)
MCV RBC AUTO: 88.3 FL (ref 79–97)
MONOCYTES # BLD AUTO: 0.65 10*3/MM3 (ref 0.1–0.9)
MONOCYTES NFR BLD AUTO: 8.7 % (ref 5–12)
NEUTROPHILS NFR BLD AUTO: 5.04 10*3/MM3 (ref 1.7–7)
NEUTROPHILS NFR BLD AUTO: 67.2 % (ref 42.7–76)
NRBC BLD AUTO-RTO: 0 /100 WBC (ref 0–0.2)
PLATELET # BLD AUTO: 239 10*3/MM3 (ref 140–450)
PMV BLD AUTO: 11 FL (ref 6–12)
POTASSIUM SERPL-SCNC: 4.9 MMOL/L (ref 3.5–5.2)
PROT SERPL-MCNC: 6.7 G/DL (ref 6–8.5)
PSA SERPL-MCNC: 5.96 NG/ML (ref 0–4)
RBC # BLD AUTO: 5.23 10*6/MM3 (ref 4.14–5.8)
SODIUM SERPL-SCNC: 141 MMOL/L (ref 136–145)
TSH SERPL DL<=0.05 MIU/L-ACNC: 2.3 UIU/ML (ref 0.27–4.2)
URATE SERPL-MCNC: 6.6 MG/DL (ref 3.4–7)
VIT B12 BLD-MCNC: 435 PG/ML (ref 211–946)
WBC NRBC COR # BLD AUTO: 7.5 10*3/MM3 (ref 3.4–10.8)

## 2024-10-08 PROCEDURE — 85025 COMPLETE CBC W/AUTO DIFF WBC: CPT | Performed by: NURSE PRACTITIONER

## 2024-10-08 PROCEDURE — 99215 OFFICE O/P EST HI 40 MIN: CPT | Performed by: NURSE PRACTITIONER

## 2024-10-08 PROCEDURE — 84153 ASSAY OF PSA TOTAL: CPT | Performed by: NURSE PRACTITIONER

## 2024-10-08 PROCEDURE — 86038 ANTINUCLEAR ANTIBODIES: CPT | Performed by: NURSE PRACTITIONER

## 2024-10-08 PROCEDURE — 86431 RHEUMATOID FACTOR QUANT: CPT | Performed by: NURSE PRACTITIONER

## 2024-10-08 PROCEDURE — 82607 VITAMIN B-12: CPT | Performed by: NURSE PRACTITIONER

## 2024-10-08 PROCEDURE — 82746 ASSAY OF FOLIC ACID SERUM: CPT | Performed by: NURSE PRACTITIONER

## 2024-10-08 PROCEDURE — 86140 C-REACTIVE PROTEIN: CPT | Performed by: NURSE PRACTITIONER

## 2024-10-08 PROCEDURE — 83036 HEMOGLOBIN GLYCOSYLATED A1C: CPT | Performed by: NURSE PRACTITIONER

## 2024-10-08 PROCEDURE — 36415 COLL VENOUS BLD VENIPUNCTURE: CPT | Performed by: NURSE PRACTITIONER

## 2024-10-08 PROCEDURE — 84550 ASSAY OF BLOOD/URIC ACID: CPT | Performed by: NURSE PRACTITIONER

## 2024-10-08 PROCEDURE — 99417 PROLNG OP E/M EACH 15 MIN: CPT | Performed by: NURSE PRACTITIONER

## 2024-10-08 PROCEDURE — 82306 VITAMIN D 25 HYDROXY: CPT | Performed by: NURSE PRACTITIONER

## 2024-10-08 PROCEDURE — 84443 ASSAY THYROID STIM HORMONE: CPT | Performed by: NURSE PRACTITIONER

## 2024-10-08 PROCEDURE — 80053 COMPREHEN METABOLIC PANEL: CPT | Performed by: NURSE PRACTITIONER

## 2024-10-08 RX ORDER — AMOXICILLIN 500 MG/1
TABLET, FILM COATED ORAL
Qty: 11 TABLET | Refills: 0 | Status: SHIPPED | OUTPATIENT
Start: 2024-10-08

## 2024-10-08 NOTE — PATIENT INSTRUCTIONS
Prediabetes Eating Plan  Prediabetes is a condition that causes blood sugar (glucose) levels to be higher than normal. This increases the risk for developing type 2 diabetes (type 2 diabetes mellitus). Working with a health care provider or nutrition specialist (dietitian) to make diet and lifestyle changes can help prevent the onset of diabetes. These changes may help you:  Control your blood glucose levels.  Improve your cholesterol levels.  Manage your blood pressure.  What are tips for following this plan?  Reading food labels  Read food labels to check the amount of fat, salt (sodium), and sugar in prepackaged foods. Avoid foods that have:  Saturated fats.  Trans fats.  Added sugars.  Avoid foods that have more than 300 milligrams (mg) of sodium per serving. Limit your sodium intake to less than 2,300 mg each day.  Shopping  Avoid buying pre-made and processed foods.  Avoid buying drinks with added sugar.  Cooking  Cook with olive oil. Do not use butter, lard, or ghee.  Bake, broil, grill, steam, or boil foods. Avoid frying.  Meal planning    Work with your dietitian to create an eating plan that is right for you. This may include tracking how many calories you take in each day. Use a food diary, notebook, or mobile application to track what you eat at each meal.  Consider following a Mediterranean diet. This includes:  Eating several servings of fresh fruits and vegetables each day.  Eating fish at least twice a week.  Eating one serving each day of whole grains, beans, nuts, and seeds.  Using olive oil instead of other fats.  Limiting alcohol.  Limiting red meat.  Using nonfat or low-fat dairy products.  Consider following a plant-based diet. This includes dietary choices that focus on eating mostly vegetables and fruit, grains, beans, nuts, and seeds.  If you have high blood pressure, you may need to limit your sodium intake or follow a diet such as the DASH (Dietary Approaches to Stop Hypertension) eating  plan. The DASH diet aims to lower high blood pressure.  Lifestyle  Set weight loss goals with help from your health care team. It is recommended that most people with prediabetes lose 7% of their body weight.  Exercise for at least 30 minutes 5 or more days a week.  Attend a support group or seek support from a mental health counselor.  Take over-the-counter and prescription medicines only as told by your health care provider.  What foods are recommended?  Fruits  Berries. Bananas. Apples. Oranges. Grapes. Papaya. Ramin. Pomegranate. Kiwi. Grapefruit. Cherries.  Vegetables  Lettuce. Spinach. Peas. Beets. Cauliflower. Cabbage. Broccoli. Carrots. Tomatoes. Squash. Eggplant. Herbs. Peppers. Onions. Cucumbers. [place] sprouts.  Grains  Whole grains, such as whole-wheat or whole-grain breads, crackers, cereals, and pasta. Unsweetened oatmeal. Bulgur. Barley. Quinoa. Brown rice. Corn or whole-wheat flour tortillas or taco shells.  Meats and other proteins  Seafood. Poultry without skin. Lean cuts of pork and beef. Tofu. Eggs. Nuts. Beans.  Dairy  Low-fat or fat-free dairy products, such as yogurt, cottage cheese, and cheese.  Beverages  Water. Tea. Coffee. Sugar-free or diet soda. Orange water. Low-fat or nonfat milk. Milk alternatives, such as soy or almond milk.  Fats and oils  Olive oil. Canola oil. Sunflower oil. Grapeseed oil. Avocado. Walnuts.  Sweets and desserts  Sugar-free or low-fat pudding. Sugar-free or low-fat ice cream and other frozen treats.  Seasonings and condiments  Herbs. Sodium-free spices. Mustard. Relish. Low-salt, low-sugar ketchup. Low-salt, low-sugar barbecue sauce. Low-fat or fat-free mayonnaise.  The items listed above may not be a complete list of recommended foods and beverages. Contact a dietitian for more information.  What foods are not recommended?  Fruits  Fruits canned with syrup.  Vegetables  Canned vegetables. Frozen vegetables with butter or cream sauce.  Grains  Refined white  flour and flour products, such as bread, pasta, snack foods, and cereals.  Meats and other proteins  Fatty cuts of meat. Poultry with skin. Breaded or fried meat. Processed meats.  Dairy  Full-fat yogurt, cheese, or milk.  Beverages  Sweetened drinks, such as iced tea and soda.  Fats and oils  Butter. Lard. Ghee.  Sweets and desserts  Baked goods, such as cake, cupcakes, pastries, cookies, and cheesecake.  Seasonings and condiments  Spice mixes with added salt. Ketchup. Barbecue sauce. Mayonnaise.  The items listed above may not be a complete list of foods and beverages that are not recommended. Contact a dietitian for more information.  Where to find more information  American Diabetes Association: www.diabetes.org  Summary  You may need to make diet and lifestyle changes to help prevent the onset of diabetes. These changes can help you control blood sugar, improve cholesterol levels, and manage blood pressure.  Set weight loss goals with help from your health care team. It is recommended that most people with prediabetes lose 7% of their body weight.  Consider following a Mediterranean diet. This includes eating plenty of fresh fruits and vegetables, whole grains, beans, nuts, seeds, fish, and low-fat dairy, and using olive oil instead of other fats.  This information is not intended to replace advice given to you by your health care provider. Make sure you discuss any questions you have with your health care provider.  Document Revised: 03/18/2021 Document Reviewed: 03/18/2021  Elsevier Patient Education © 2024 [employer]

## 2024-10-08 NOTE — PROGRESS NOTES
Chief Complaint  No chief complaint on file.    Subjective      History of Present Illness         Past Medical History:   Diagnosis Date    COPD (chronic obstructive pulmonary disease)     Gout     H/O cold sores     Renal disorder         Past Surgical History:   Procedure Laterality Date    COLONOSCOPY  12/05/2019    LIPOMA EXCISION  2010    removed from back        Social History     Tobacco Use   Smoking Status Never    Passive exposure: Past   Smokeless Tobacco Never        Patient Care Team:  Parul Aleman APRN as PCP - General (Nurse Practitioner)  Meño Maxwell MD as Consulting Physician (General Surgery)  Brigitte Reyes APRN as Nurse Practitioner (Urology)  Molina Lester MD (Internal Medicine)    No Known Allergies       Current Outpatient Medications:     allopurinol (ZYLOPRIM) 300 MG tablet, Take 1 tablet by mouth Daily., Disp: 90 tablet, Rfl: 3    colchicine 0.6 MG tablet, Take 1 tablet by mouth Daily. (Patient not taking: Reported on 4/30/2024), Disp: 30 tablet, Rfl: 5    HYDROcodone-acetaminophen (NORCO) 5-325 MG per tablet, TAKE 1 TABLET BY MOUTH EVERY 6 HOURS AS NEEDED FOR PAIN FOR 3 DAYS (Patient not taking: Reported on 4/30/2024), Disp: , Rfl:     ibuprofen (ADVIL,MOTRIN) 600 MG tablet, Take 1 tablet by mouth Every 6 (Six) Hours. (Patient not taking: Reported on 4/30/2024), Disp: , Rfl:     Lactobacillus (Florajen Acidophilus) capsule, Take 1 capsule by mouth Daily. (Patient not taking: Reported on 4/30/2024), Disp: 28 capsule, Rfl: 0    methocarbamol (ROBAXIN) 500 MG tablet, TAKE 1 TABLET BY MOUTH FOUR TIMES A DAY FOR 3 DAYS (Patient not taking: Reported on 4/30/2024), Disp: , Rfl:     predniSONE (DELTASONE) 20 MG tablet, Take 2 tabs each morning with food for 7 days. (Patient not taking: Reported on 4/30/2024), Disp: 14 tablet, Rfl: 0    tamsulosin (FLOMAX) 0.4 MG capsule 24 hr capsule, Take 2 capsules by mouth Daily for 360 days., Disp: 180 capsule, Rfl: 3    valACYclovir  (VALTREX) 500 MG tablet, Take 1 tablet by mouth Daily., Disp: 90 tablet, Rfl: 3    Objective     There were no vitals filed for this visit.     Wt Readings from Last 3 Encounters:   04/30/24 119 kg (263 lb)   02/14/24 125 kg (275 lb)   01/31/24 121 kg (266 lb 12.1 oz)        BP Readings from Last 3 Encounters:   04/30/24 137/79   02/14/24 115/70   01/31/24 127/85        Physical Exam      Physical Exam           Result Review   The following data was reviewed by: APPLE Madrid on 10/08/2024:  [x]  Tests & Results  []  Hospitalization/Emergency Department/Urgent Care  []  Internal/External Consultant Notes       Assessment and Plan     Diagnoses and all orders for this visit:    1. Left arm pain (Primary)         Assessment & Plan         {BMI is >= 30 and <35. (Class 1 Obesity). The following options were offered after discussion;:[id number]}       Patient was given instructions and counseling regarding his condition or for health maintenance advice. Please see specific information pulled into the AVS if appropriate.     Follow Up   No follow-ups on file.    {LARRY [employer] Provider Statement:[id number]}    APPLE [place]

## 2024-10-08 NOTE — TELEPHONE ENCOUNTER
Cardiology where referral was placed called and stated problems with what was being faxed. Also gave a new fax number: [phone]

## 2024-10-08 NOTE — PROGRESS NOTES
Chief Complaint  Follow-up (The patient states last week he went to bed, when he woke up his left side of the face was numb, his left arm was bothering him as well. He states all week he has stopped all sugars. He states his hands are kindve numb. Patient would like lab work today. He states he is always tired. Would like sleep study. Would like to discuss urinary problems. ) and HERNIA (The patient states that he is having trouble with his hernia. He states its getting worse. He states this has always been there. He would like imaging if acceptable to check the size of it, may want referral. )    Subjective      History of Present Illness  The patient is a 59-year-old male patient here for multiple medical problems, in follow-up of chronic gout, prediabetes, vitamin D deficiency, history of hypertension, not on medication, obstructive sleep apnea, umbilical hernia, and now with complaints of left arm pain, left side of face numb.    He is experiencing a sore throat, swelling, and coughing.       Past Medical History:   Diagnosis Date    COPD (chronic obstructive pulmonary disease)     Gout     H/O cold sores     Renal disorder         Past Surgical History:   Procedure Laterality Date    COLONOSCOPY  12/05/2019    LIPOMA EXCISION  2010    removed from back        Social History     Tobacco Use   Smoking Status Never    Passive exposure: Past   Smokeless Tobacco Never        Patient Care Team:  Parul Aleman APRN as PCP - General (Nurse Practitioner)  Meño Maxwell MD as Consulting Physician (General Surgery)  Brigitte Reyes APRN as Nurse Practitioner (Urology)  Molina Lester MD (Internal Medicine)    No Known Allergies       Current Outpatient Medications:     allopurinol (ZYLOPRIM) 300 MG tablet, Take 1 tablet by mouth Daily., Disp: 90 tablet, Rfl: 3    tamsulosin (FLOMAX) 0.4 MG capsule 24 hr capsule, Take 2 capsules by mouth Daily for 360 days., Disp: 180 capsule, Rfl: 3    valACYclovir  "(VALTREX) 500 MG tablet, Take 1 tablet by mouth Daily., Disp: 90 tablet, Rfl: 3    Objective     Vitals:    10/08/24 0919   BP: 130/90   BP Location: Right arm   Patient Position: Sitting   Cuff Size: Large Adult   Pulse: 86   Temp: 97.3 °F (36.3 °C)   TempSrc: Temporal   SpO2: 97%   Weight: 119 kg (263 lb)   Height: 193 cm (75.98\")        Wt Readings from Last 3 Encounters:   10/08/24 119 kg (263 lb)   04/30/24 119 kg (263 lb)   02/14/24 125 kg (275 lb)        BP Readings from Last 3 Encounters:   10/08/24 130/90   04/30/24 137/79   02/14/24 115/70        Physical Exam      Physical Exam           Result Review   The following data was reviewed by: APPLE Madrid on 10/08/2024:  [x]  Tests & Results  []  Hospitalization/Emergency Department/Urgent Care  []  Internal/External Consultant Notes       Assessment and Plan     Diagnoses and all orders for this visit:    1. Left arm pain (Primary)    2. Obstructive sleep apnea syndrome    3. Umbilical hernia without obstruction and without gangrene    4. Prediabetes    5. Vitamin D deficiency    6. Chronic gout of multiple sites, unspecified cause    7. Essential hypertension         Assessment & Plan         {BMI is >= 30 and <35. (Class 1 Obesity). The following options were offered after discussion;:[id number]}       Patient was given instructions and counseling regarding his condition or for health maintenance advice. Please see specific information pulled into the AVS if appropriate.     Follow Up   Return for Next scheduled follow up, or if symptoms worsen or fail to improve.    {LARRY [employer] Provider Statement:[id number]}    APPLE [place]  "

## 2024-10-08 NOTE — PROGRESS NOTES
Chief Complaint  Follow-up (The patient states last week he went to bed, when he woke up his left side of the face was numb, his left arm was bothering him as well. He states all week he has stopped all sugars. He states his hands are kindve numb. Patient would like lab work today. He states he is always tired. Would like sleep study. Would like to discuss urinary problems. ) and HERNIA (The patient states that he is having trouble with his hernia. He states its getting worse. He states this has always been there. He would like imaging if acceptable to check the size of it, may want referral. )    Subjective      History of Present Illness  The patient is a 59-year-old male patient here for multiple medical problems, in follow-up of chronic gout, prediabetes, vitamin D deficiency, history of hypertension, not on medication, obstructive sleep apnea, umbilical hernia, and now with complaints of left arm pain, left side of face numb.    The patient states that his hands including all fingers are numb and that this has been going on for several years.  He has also experienced left arm pain and reports no injury.  He states this has been going on for months.      The patient had an episode about 1 week ago where his left face was numb after waking up from a nap.  He states this occurred after 2 to 3 weeks of having pain in his left lower gum area.  He has not had any recent dental work.  The gum is still irritated.    The patient states that his hernia has worsened by enlarging and he is now having some pain in the area.  He is interested in having this further evaluated by scan.  Patient is also concerned that he has a family history of aortic aneurysm and would like to do a scan.       Past Medical History:   Diagnosis Date    COPD (chronic obstructive pulmonary disease)     Gout     H/O cold sores     Renal disorder         Past Surgical History:   Procedure Laterality Date    COLONOSCOPY  12/05/2019    LIPOMA  "EXCISION  2010    removed from back        Social History     Tobacco Use   Smoking Status Never    Passive exposure: Past   Smokeless Tobacco Never        Patient Care Team:  Parul Aleman APRN as PCP - General (Nurse Practitioner)  Meño Maxwell MD as Consulting Physician (General Surgery)  Brigitte Reyes APRN as Nurse Practitioner (Urology)  Molina Lester MD (Internal Medicine)    No Known Allergies       Current Outpatient Medications:     allopurinol (ZYLOPRIM) 300 MG tablet, Take 1 tablet by mouth Daily., Disp: 90 tablet, Rfl: 3    tamsulosin (FLOMAX) 0.4 MG capsule 24 hr capsule, Take 2 capsules by mouth Daily for 360 days., Disp: 180 capsule, Rfl: 3    valACYclovir (VALTREX) 500 MG tablet, Take 1 tablet by mouth Daily., Disp: 90 tablet, Rfl: 3    amoxicillin (AMOXIL) 500 MG tablet, Take 2 tablets x 1 dose then 1 tablet 3 times a day for 3 days., Disp: 11 tablet, Rfl: 0    Objective     Vitals:    10/08/24 0919   BP: 130/90   BP Location: Right arm   Patient Position: Sitting   Cuff Size: Large Adult   Pulse: 86   Temp: 97.3 °F (36.3 °C)   TempSrc: Temporal   SpO2: 97%   Weight: 119 kg (263 lb)   Height: 193 cm (75.98\")        Wt Readings from Last 3 Encounters:   10/08/24 119 kg (263 lb)   04/30/24 119 kg (263 lb)   02/14/24 125 kg (275 lb)        BP Readings from Last 3 Encounters:   10/08/24 130/90   04/30/24 137/79   02/14/24 115/70          Physical Exam  Vitals reviewed.   Constitutional:       General: He is not in acute distress.  HENT:      Head: Normocephalic and atraumatic.      Mouth/Throat:      Dentition: Gingival swelling present. No dental tenderness.   Neck:      Vascular: No carotid bruit.   Cardiovascular:      Rate and Rhythm: Normal rate and regular rhythm.      Heart sounds: Normal heart sounds.   Pulmonary:      Effort: Pulmonary effort is normal.      Breath sounds: Normal breath sounds. No wheezing, rhonchi or rales.   Abdominal:      Palpations: Abdomen is soft.    "   Tenderness: There is no abdominal tenderness.      Hernia: A hernia is present. Hernia is present in the umbilical area.   Musculoskeletal:      Left upper arm: No swelling, deformity or tenderness.      Right wrist: No swelling, deformity, tenderness or bony tenderness. Normal range of motion.      Left wrist: No swelling, deformity, tenderness or bony tenderness. Normal range of motion.      Right hand: No swelling or tenderness. Normal range of motion. Normal strength.      Left hand: No swelling or tenderness. Normal range of motion. Normal strength.      Right lower leg: No edema.      Left lower leg: No edema.      Comments: Negative Tinel's test.   Lymphadenopathy:      Cervical: No cervical adenopathy.   Skin:     General: Skin is warm and dry.   Neurological:      General: No focal deficit present.      Mental Status: He is alert.   Psychiatric:         Thought Content: Thought content normal.         Result Review   The following data was reviewed by: APPLE Madrid on 10/08/2024:  [x]  Tests & Results  []  Hospitalization/Emergency Department/Urgent Care  [x]  Internal/External Consultant Notes       Assessment and Plan     Diagnoses and all orders for this visit:    1. Umbilical hernia without obstruction and without gangrene (Primary)  -     CT Abd With & Without Contrast Pelvis With Contrast; Future  -     Ambulatory Referral to General Surgery    2. Left facial pain    3. Gingivitis    4. Left arm pain  -     Ambulatory Referral to Cardiology    5. Bilateral hand numbness  -     Folate  -     Vitamin B12  -     Rheumatoid Factor; Future  -     C-reactive protein; Future  -     YIFAN; Future  -     Rheumatoid Factor  -     C-reactive protein  -     YIFAN    6. Obstructive sleep apnea syndrome  -     Ambulatory Referral to Sleep Medicine    7. Prediabetes  -     Hemoglobin A1c    8. Vitamin D deficiency  -     Vitamin D,25-Hydroxy    9. Essential hypertension  -     Ambulatory Referral to  Cardiology  -     Comprehensive Metabolic Panel  -     CBC & Differential  -     TSH Rfx On Abnormal To Free T4    10. Chronic gout of multiple sites, unspecified cause  -     Uric acid    11. Family history of aortic aneurysm  -     CT Abd With & Without Contrast Pelvis With Contrast; Future    Other orders  -     amoxicillin (AMOXIL) 500 MG tablet; Take 2 tablets x 1 dose then 1 tablet 3 times a day for 3 days.  Dispense: 11 tablet; Refill: 0         Assessment & Plan  1. Umbilical hernia.  A CT scan of the abdomen and pelvis with and without contrast will be performed to evaluate the hernia and check for an aortic aneurysm. A referral to Dr. Maxwell has been made for further evaluation and potential repair.    2. Left-sided facial numbness.  The numbness in the left side of the face is likely related to come issues. A short course of amoxicillin is recommended, with a dosage of 2 tablets today, followed by 1 tablet three times a day for 3 days.  Advised if he experiences sudden numbness or weakness on one side of the body, an immediate emergency room visit is warranted.    3. Left arm pain and bilateral hand numbness.  The left arm pain and hand numbness are suspected to be related to carpal tunnel syndrome. A nerve conduction test may be considered if symptoms persist. Wrist splints can be used to provide support and alleviate symptoms.  He was advised to follow back up with his cardiologist after being taken off his antihypertensive medication.  Blood pressure today 130/90.  He was also advised to discuss the left arm pain with cardiology to see if that may be related to any heart issue.    4. Obstructive sleep apnea.  A referral to sleep medicine has been made to address the patient's obstructive sleep apnea and to conduct a sleep study. A CPAP machine may be recommended based on the results.    5. Prediabetes.  Continue monitoring blood glucose levels and maintain a balanced diet. Regular follow-ups are  necessary to monitor the condition.    6. Vitamin D deficiency.  Continue current vitamin D supplementation. Regular monitoring of vitamin D levels is recommended.    7. Hypertension.  The patient is not currently on medication for hypertension. Regular monitoring of blood pressure is advised. A referral to cardiology has been made for further evaluation.    8. Gout.  Continue current medication regimen with allopurinol. No recent flare-ups reported. Regular monitoring of uric acid levels is recommended.    9. Health Maintenance.  A PSA test has already been ordered by his urologist.  Blood work, including a rheumatoid blood test, will be conducted.       BMI is >= 30 and <35. (Class 1 Obesity). The following options were offered after discussion;: exercise counseling/recommendations and nutrition counseling/recommendations    60 minutes were spent caring for Alli on this date of service. This time spent by me includes preparing for the visit, reviewing tests, obtaining/reviewing separately obtained history, performing medically appropriate exam, educating the patient, ordering medications/tests/procedures, referring to other health care professionals, documenting information in the medical record, independently interpreting results and communicating that with the patient.      Patient was given instructions and counseling regarding his condition or for health maintenance advice. Please see specific information pulled into the AVS if appropriate.     Follow Up   Return for Follow-up post test.    Patient or patient representative verbalized consent for the use of Ambient Listening during the visit with  APPLE [place] for chart documentation. 10/8/2024  09:46 EDT    APPLE [place]

## 2024-10-09 DIAGNOSIS — R97.20 ELEVATED PROSTATE SPECIFIC ANTIGEN (PSA): Primary | ICD-10-CM

## 2024-10-09 LAB — ANA SER QL: NEGATIVE

## 2024-12-05 DIAGNOSIS — R97.20 ELEVATED PROSTATE SPECIFIC ANTIGEN (PSA): Primary | ICD-10-CM

## 2024-12-06 NOTE — PROGRESS NOTES
Chief Complaint: Benign prostatic hyperplasia (BPH) with post-void dribbling    Subjective         History of Present Illness  Alli Brower is a 59 y.o. male presents to [facility] to be seen for follow-up for BPH and elevated PSA.  His last visit was on 4/30/2024.  At that visit, we did go ahead and order an MRI of the prostate. His MRI was negative. He was advised to repeat his PSA in 6 months.  He is here to follow-up.    Patient does report that he is taking a lot of vitamin B12 at this time between vitamins and extra supplements that he is doing.    PSA  12/9/2024 6.94, PSAd 0.10  10/8/2024 5.96    Previous 4/30/2024:  Patient was previously seen by me with last visit on 1/31/2024 for BPH.  At that visit he did have bothersome urinary symptoms and his PSA had gone up so we did treat him with a 1 month course of antibiotics as well as increase his tamsulosin to 2 capsules daily.  He was advised to follow-up in 3 months with a PSA prior.  He is here for follow-up.           PSA  4/29/2024 4.82     Previous 1/31/2024:  Patient was previously seen by me with last visit on 7/31/2023 for BPH.  At that visit he was advised to continue with tamsulosin and come back in 6 months with a PSA prior.  His PVR was 0.  He is here for follow-up.     He had run out of tamsulosin about 1 week ago. He is going about 20-30 times per day. He was going that much even when on tamsulosin. Denies dysuria. He does report some sensitivity with intercourse at the tip of penis. He denies perineal pain. He does report some urge incontinence at times.      He is having some difficulty with erections. He feels this may be related to his prostate symptoms.      PSA  1/30/2024 4.74  2/3/2023 3.5  12/30/2020 3.39  11/7/2019  3.07        Previous 7/31/2023:  Patient was previously seen by me with last visit on 2/28/2023 for BPH.  He had been started on tamsulosin by his PCP prior to this appointment.  He is here for  follow-up.     He reports that he does at times forget to take tamsulosin. But overall sees improvement. He doesn't see a much post void dribble as he was.            Previous 2/28/2023:  Patient presents reporting he is having post void dribbling for a couple of years. He reports he does not have the same sex drive as he has had previously. He has gained weight since changing jobs- 30lbs from August to January. He is currently on Weygovy and making diet changes to see if this helps. Has stopped soda and doing a Mediterranean diet.      He was started on Tamsulosin 2 weeks ago. Previously had been on it over a year ago but stopped it due to concern that he could have side effects.      Reports going less frequently at night since starting Tamsulosin.         Post void dribbling   Frequency- every 1-2 hours   Urgency- with some leakage - rare   Nocturia x 2   GH- denies   Incontinence- with urgency- rare   History of  surgery- denies   Family history of  malignancy- denies   Cardiopulmonary- denies   Non-Smoker   Anticoagulants- denies     PSA  2/3/2023 3.5  12/30/2020 3.39  11/7/2019 3.07       Objective     Past Medical History:   Diagnosis Date    COPD (chronic obstructive pulmonary disease)     Gout     H/O cold sores     Renal disorder        Past Surgical History:   Procedure Laterality Date    COLONOSCOPY  12/05/2019    LIPOMA EXCISION  2010    removed from back         Current Outpatient Medications:     allopurinol (ZYLOPRIM) 300 MG tablet, Take 1 tablet by mouth Daily., Disp: 90 tablet, Rfl: 3    tamsulosin (FLOMAX) 0.4 MG capsule 24 hr capsule, Take 2 capsules by mouth Daily for 360 days., Disp: 180 capsule, Rfl: 3    valACYclovir (VALTREX) 500 MG tablet, Take 1 tablet by mouth Daily., Disp: 90 tablet, Rfl: 3    amoxicillin (AMOXIL) 500 MG tablet, Take 2 tablets x 1 dose then 1 tablet 3 times a day for 3 days. (Patient not taking: Reported on 12/10/2024), Disp: 11 tablet, Rfl: 0    No Known Allergies  "    Family History   Problem Relation Age of Onset    No Known Problems Mother     Aortic aneurysm Father     Heart attack Paternal Uncle        Social History     Socioeconomic History    Marital status:    Tobacco Use    Smoking status: Never     Passive exposure: Past    Smokeless tobacco: Never   Vaping Use    Vaping status: Never Used   Substance and Sexual Activity    Alcohol use: Never    Drug use: Never    Sexual activity: Defer       Vital Signs:   Resp 14   Ht 193 cm (75.98\")   Wt 119 kg (262 lb 5.6 oz)   BMI 31.95 kg/m²      Physical Exam  Vitals reviewed.   Constitutional:       Appearance: Normal appearance.   Neurological:      General: No focal deficit present.      Mental Status: He is alert and oriented to person, place, and time.   Psychiatric:         Mood and Affect: Mood normal.         Behavior: Behavior normal.          Result Review :   The following data was reviewed by: APPLE oBx on 12/10/2024:     PSA          4/29/2024    06:51 10/8/2024    11:01 12/9/2024    14:21   PSA   PSA 4.820  5.960  6.940      Bladder Scan interpretation 12/10/2024    Estimation of residual urine via [employer] 3000 [place] Bladder Scan  MA/nurse performing: Ailyn BECKER MA  Residual Urine: 0 ml  Indication: Benign prostatic hyperplasia (BPH) with post-void dribbling    Elevated prostate specific antigen (PSA)   Position: Supine  Examination: Incremental scanning of the suprapubic area using 2.0 MHz transducer using copious amounts of acoustic gel.   Findings: An anechoic area was demonstrated which represented the bladder, with measurement of residual urine as noted. I inspected this myself. In that the residual urine was stable or insignificant, refer to plan for treatment and plan necessary at this time.         Procedures        Assessment and Plan    Diagnoses and all orders for this visit:    1. Benign prostatic hyperplasia (BPH) with post-void dribbling (Primary)  -     Bladder " Scan    2. Elevated prostate specific antigen (PSA)  -     Cancel: MRI Prostate With & Without Contrast; Future  -     PSA DIAGNOSTIC; Future    Given that he is taking an excessive amount of vitamin B12 at this time he is going to stop all of his supplements.  We will repeat his PSA in 1 month.  If it does remain elevated we will proceed with MRI as scheduled.  I am going to go ahead and do an exosome urine test to see the results of this while we are waiting for his repeat PSA.    If his PSA does come back down to normal and his exosome is within the normal range, we will plan to repeat his PSA in 6 months.    He will continue on with his tamsulosin as previously ordered.      Follow Up   Return in about 6 months (around 6/10/2025).  Patient was given instructions and counseling regarding his condition or for health maintenance advice. Please see specific information pulled into the AVS if appropriate.         This document has been electronically signed by APPLE Box  December 10, 2024 10:39 EST

## 2024-12-09 ENCOUNTER — LAB (OUTPATIENT)
Dept: LAB | Facility: HOSPITAL | Age: 59
End: 2024-12-09
Payer: COMMERCIAL

## 2024-12-09 DIAGNOSIS — R97.20 ELEVATED PROSTATE SPECIFIC ANTIGEN (PSA): ICD-10-CM

## 2024-12-09 LAB — PSA SERPL-MCNC: 6.94 NG/ML (ref 0–4)

## 2024-12-09 PROCEDURE — 36415 COLL VENOUS BLD VENIPUNCTURE: CPT

## 2024-12-09 PROCEDURE — 84153 ASSAY OF PSA TOTAL: CPT

## 2024-12-10 ENCOUNTER — OFFICE VISIT (OUTPATIENT)
Dept: UROLOGY | Age: 59
End: 2024-12-10
Payer: COMMERCIAL

## 2024-12-10 VITALS — RESPIRATION RATE: 14 BRPM | HEIGHT: 76 IN | WEIGHT: 262.35 LBS | BODY MASS INDEX: 31.95 KG/M2

## 2024-12-10 DIAGNOSIS — R97.20 ELEVATED PROSTATE SPECIFIC ANTIGEN (PSA): ICD-10-CM

## 2024-12-10 DIAGNOSIS — N39.43 BENIGN PROSTATIC HYPERPLASIA (BPH) WITH POST-VOID DRIBBLING: Primary | ICD-10-CM

## 2024-12-10 DIAGNOSIS — N40.1 BENIGN PROSTATIC HYPERPLASIA (BPH) WITH POST-VOID DRIBBLING: Primary | ICD-10-CM

## 2024-12-10 LAB — URINE VOLUME: 0

## 2024-12-18 ENCOUNTER — PREP FOR SURGERY (OUTPATIENT)
Dept: OTHER | Facility: HOSPITAL | Age: 59
End: 2024-12-18
Payer: COMMERCIAL

## 2024-12-18 ENCOUNTER — OFFICE VISIT (OUTPATIENT)
Dept: SURGERY | Facility: CLINIC | Age: 59
End: 2024-12-18
Payer: COMMERCIAL

## 2024-12-18 VITALS — RESPIRATION RATE: 16 BRPM | HEIGHT: 76 IN | WEIGHT: 271 LBS | BODY MASS INDEX: 33 KG/M2

## 2024-12-18 DIAGNOSIS — K42.9 UMBILICAL HERNIA WITHOUT OBSTRUCTION AND WITHOUT GANGRENE: Primary | ICD-10-CM

## 2024-12-18 RX ORDER — SODIUM CHLORIDE 9 MG/ML
40 INJECTION, SOLUTION INTRAVENOUS AS NEEDED
OUTPATIENT
Start: 2024-12-18

## 2024-12-18 RX ORDER — SODIUM CHLORIDE 0.9 % (FLUSH) 0.9 %
10 SYRINGE (ML) INJECTION AS NEEDED
OUTPATIENT
Start: 2024-12-18

## 2024-12-18 RX ORDER — SODIUM CHLORIDE 0.9 % (FLUSH) 0.9 %
10 SYRINGE (ML) INJECTION EVERY 12 HOURS SCHEDULED
OUTPATIENT
Start: 2024-12-18

## 2024-12-18 RX ORDER — ONDANSETRON 2 MG/ML
4 INJECTION INTRAMUSCULAR; INTRAVENOUS EVERY 6 HOURS PRN
OUTPATIENT
Start: 2024-12-18

## 2024-12-18 NOTE — PROGRESS NOTES
Chief Complaint: Hernia (Umbilical hernia )    Subjective       History of Present Illness    Alli Brower is a 59 y.o. male presents to [facility] GENERAL SURGERY   History of Present Illness  The patient is a 59-year-old male who presents for evaluation of an umbilical hernia.    He reports a progressive enlargement of his umbilical hernia, which occasionally protrudes and causes discomfort. He experiences pain on certain days, particularly after prolonged periods of standing. The pain is accompanied by a sensation of movement within the abdomen and occasional sharp pains during specific activities such as sweeping motions. He also reports a mild, uncomfortable feeling in his stomach when the hernia is painful, but he does not experience any nausea or vomiting. 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 He expresses concern about the potential need for surgical intervention and the associated risks, including the possibility of infection, erosion into surrounding structures, and chronic pain. He is also apprehensive about the use of mesh in hernia repair and its potential complications. He plans to schedule the surgery after January 2025, following the opening of a new facility at his workplace.    Supplemental Information  He is going through some prostate issues. He has to urinate in a container and send it away for testing. He will be back here in 30 days no matter what. He thinks it is getting enlarged. He is on tamsulosin. He was told about a procedure called TURP. He is concerned about the effect of TURP on his ability to get an erection.    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    MEDICATIONS  Current: tamsulosin  Objective     Past Medical History:   Diagnosis Date    COPD (chronic obstructive pulmonary  "disease)     Gout     H/O cold sores     Renal disorder        Past Surgical History:   Procedure Laterality Date    COLONOSCOPY  12/05/2019    LIPOMA EXCISION  2010    removed from back         Current Outpatient Medications:     allopurinol (ZYLOPRIM) 300 MG tablet, Take 1 tablet by mouth Daily., Disp: 90 tablet, Rfl: 3    tamsulosin (FLOMAX) 0.4 MG capsule 24 hr capsule, Take 2 capsules by mouth Daily for 360 days., Disp: 180 capsule, Rfl: 3    valACYclovir (VALTREX) 500 MG tablet, Take 1 tablet by mouth Daily., Disp: 90 tablet, Rfl: 3    amoxicillin (AMOXIL) 500 MG tablet, Take 2 tablets x 1 dose then 1 tablet 3 times a day for 3 days. (Patient not taking: Reported on 12/18/2024), Disp: 11 tablet, Rfl: 0    No Known Allergies     Family History   Problem Relation Age of Onset    No Known Problems Mother     Aortic aneurysm Father     Heart attack Paternal Uncle        Social History     Socioeconomic History    Marital status:    Tobacco Use    Smoking status: Never     Passive exposure: Past    Smokeless tobacco: Never   Vaping Use    Vaping status: Never Used   Substance and Sexual Activity    Alcohol use: Never    Drug use: Never    Sexual activity: Defer       Vital Signs:   Resp 16   Ht 193 cm (76\")   Wt 123 kg (271 lb)   BMI 32.99 kg/m²      Physical Exam   Physical Exam  The patient is in no acute distress.  Breathing is nonlabored.  The abdomen is soft. There appears to be about a 2 cm umbilical hernia with some mild overlying skin changes with some purplish skin.      Result Review :         Procedures   Results             Assessment and Plan   Diagnoses and all orders for this visit:    1. Umbilical hernia without obstruction and without gangrene (Primary)          Assessment & Plan  1. Symptomatic umbilical hernia.  He reports that the hernia is getting bigger and causes discomfort, especially when standing for long periods or performing certain motions. He experiences occasional sharp pain " and a general uncomfortable feeling in his stomach but no significant nausea or vomiting. A robotic umbilical hernia repair with a piece of permanent mesh is planned. The risks, benefits, and alternatives of the procedure were discussed extensively. Risks include bleeding, infection, and potential injury to surrounding structures. The use of mesh, including the possibility of infection, erosion, and chronic pain, was also discussed. He voiced understanding and agreed to proceed with the plan. Postoperative restrictions include no heavy lifting greater than 15 pounds for 4 weeks and avoiding submersion in water for 2 weeks. Pain medication and a stool softener will be provided postoperatively.       Follow Up   No follow-ups on file.  Patient was given instructions and counseling regarding his condition or for health maintenance advice. Please see specific information pulled into the AVS if appropriate.     Discussed with the patient - all questions were answered they voiced understanding and agreed to proceed with above plan    Patient or patient representative verbalized consent for the use of Ambient Listening during the visit with  Alli Marinelli MD for chart documentation. 12/18/2024  15:26 EST    Alli Marinelli MD

## 2025-02-04 DIAGNOSIS — R97.20 ELEVATED PROSTATE SPECIFIC ANTIGEN (PSA): Primary | ICD-10-CM

## 2025-03-18 ENCOUNTER — TELEPHONE (OUTPATIENT)
Dept: UROLOGY | Age: 60
End: 2025-03-18
Payer: COMMERCIAL

## 2025-03-18 NOTE — TELEPHONE ENCOUNTER
PATIENT CALLED AND ASKED FOR A REFILL ON HIS ANTIBIOTIC.  HE HAS TO URINATE 40 - 50 TIMES A DAY.  NO BURNING.    HE SAID HE MISSED A COUPLE OF APPOINTMENTS, DUE TO NOT HAVING INSURANCE, BUT HAS INSURANCE NOW.  IFEANYI IS UPDATING.    PHARMACY VERIFIED.    #[phone]

## 2025-03-18 NOTE — TELEPHONE ENCOUNTER
Can you call this patient and let him know that he needs to be seen?  We need to get this scheduled rather than doing another antibiotic.

## 2025-03-19 ENCOUNTER — TELEPHONE (OUTPATIENT)
Dept: UROLOGY | Age: 60
End: 2025-03-19

## 2025-03-25 ENCOUNTER — TELEPHONE (OUTPATIENT)
Dept: UROLOGY | Age: 60
End: 2025-03-25

## 2025-03-25 DIAGNOSIS — R30.0 DYSURIA: Primary | ICD-10-CM

## 2025-03-25 NOTE — TELEPHONE ENCOUNTER
PATIENT CALLED IN STATING HE WAS WAITING FOR COWORKER TO RETURN FROM THEIR APPT SO HE COULD MAKE IT TO HIS 11:30 APPT AND STATED HE WOULD NOT MAKE IT IN TIME.    INFORMED PT PER GONZALES WE COULD ORDER HIM A URINE CULTURE AND CALL HIM WITH RESULTS.     PATIENT WAS AGREEABLE TO URINE CULTURE AND ALSO ASKED TO DO HIS PSA AS WELL.     R/S APPT TO 4/7

## 2025-03-31 ENCOUNTER — LAB (OUTPATIENT)
Facility: HOSPITAL | Age: 60
End: 2025-03-31
Payer: COMMERCIAL

## 2025-03-31 DIAGNOSIS — R30.0 DYSURIA: ICD-10-CM

## 2025-03-31 DIAGNOSIS — R97.20 ELEVATED PROSTATE SPECIFIC ANTIGEN (PSA): ICD-10-CM

## 2025-03-31 LAB — PSA SERPL-MCNC: 6.87 NG/ML (ref 0–4)

## 2025-03-31 PROCEDURE — 36415 COLL VENOUS BLD VENIPUNCTURE: CPT

## 2025-03-31 PROCEDURE — 84153 ASSAY OF PSA TOTAL: CPT

## 2025-03-31 PROCEDURE — 87086 URINE CULTURE/COLONY COUNT: CPT

## 2025-04-02 LAB — BACTERIA SPEC AEROBE CULT: NO GROWTH

## 2025-04-03 ENCOUNTER — TELEPHONE (OUTPATIENT)
Dept: UROLOGY | Age: 60
End: 2025-04-03
Payer: COMMERCIAL

## 2025-04-03 NOTE — TELEPHONE ENCOUNTER
Spoke to patient to let him know that we  need to re-schedule his appointment due to provider being out on Monday; re-scheduled to 5/7/25 @ 815m

## 2025-04-06 NOTE — PROGRESS NOTES
Chief Complaint  Annual Exam (59 year old male here today for his annual physical. Patient would like fasting labs today, he would like a TB blood test, would like his testosterone checked, he would like a complete blood panel done. Patient would like to have a prescription renewed for diclofenac.the patient would like to be prescribed Viagra.)    Subjective      History of Present Illness  The patient is a 59-year-old male who presents for his annual wellness exam. He is seeing a urologist for elevated PSA and is scheduled for an appointment with them in 1 month. He is following up for gout and a history of herpes.    He has expressed interest in undergoing a TB test due to potential employment changes. Additionally, he wishes to have his testosterone levels assessed. He suspects a deficiency in vitamin D and is currently supplementing his diet with it. He has been diagnosed with prediabetes and was previously on Wegovy, but had to discontinue it due to insurance coverage issues. He has recently resumed this medication. He reports persistent fatigue and has requested a prescription for vitamin D. He has not sought cardiology consultation due to insurance changes. He has a history of umbilical hernia, which he manages by manually reducing it when it protrudes. He plans to have this surgically corrected as it occasionally causes nausea. He has been performing breathing exercises and has made dietary modifications, including reducing his intake of Diet Coke and increasing his water consumption. He has also incorporated Liquid IV into his regimen. He has initiated a fitness routine at the gym and is taking a testosterone booster. His daily supplements include zinc, vitamin C, L-lysine, and vitamin D 5000 mg.    He has been experiencing frequent urination, necessitating bathroom visits every 10 minutes. A UTI test was negative, as was an STD panel. He continues to experience increased urinary frequency and a decreased  urine stream. He has an upcoming appointment with his urologist on 05/07/2025. He completed a course of Bactrim over the weekend, which did not alleviate his symptoms. He suspects a potential irritant in his soap may be contributing to his symptoms.    He is currently on allopurinol for gout management.    He has a history of herpes and takes valacyclovir intermittently to prevent cold sores.    He has been taking diclofenac for knee discomfort and soreness, which he believes may have contributed to a decrease in his prostate size. He is also on tamsulosin.    FAMILY HISTORY  He has a family history of diabetes, with one of his uncles having the condition.    MEDICATIONS  Current: Tamsulosin, allopurinol, valacyclovir, diclofenac, men's health vitamin, zinc, vitamin C, L lysine, vitamin D 5000 mg       Past Medical History:   Diagnosis Date    COPD (chronic obstructive pulmonary disease)     Gout     H/O cold sores     Renal disorder         Past Surgical History:   Procedure Laterality Date    COLONOSCOPY  12/05/2019    LIPOMA EXCISION  2010    removed from back        Social History     Tobacco Use   Smoking Status Never    Passive exposure: Past   Smokeless Tobacco Never        Patient Care Team:  Parul Aleman APRN as PCP - General (Nurse Practitioner)  eMño Maxwell MD as Consulting Physician (General Surgery)  Brigitte Reyes APRN as Nurse Practitioner (Urology)  Molina Lester MD (Internal Medicine)  Alli Marinelli MD as Consulting Physician (General Surgery)    No Known Allergies       Current Outpatient Medications:     allopurinol (ZYLOPRIM) 300 MG tablet, Take 1 tablet by mouth Daily., Disp: 90 tablet, Rfl: 3    tamsulosin (FLOMAX) 0.4 MG capsule 24 hr capsule, Take 2 capsules by mouth Daily., Disp: , Rfl:     valACYclovir (VALTREX) 500 MG tablet, Take 1 tablet by mouth Daily., Disp: 90 tablet, Rfl: 3    Semaglutide-Weight Management 0.25 MG/0.5ML solution auto-injector, Inject 0.05  "mL under the skin into the appropriate area as directed 1 (One) Time Per Week. After 4 weeks increase to the next dose., Disp: 2 mL, Rfl: 0    vitamin D (ERGOCALCIFEROL) 1.25 MG (90729 UT) capsule capsule, Take 1 capsule only once per week with a fatty meal., Disp: 13 capsule, Rfl: 3    Objective     Vitals:    04/08/25 0944 04/08/25 1041   BP: 130/90 100/80   BP Location: Right arm    Patient Position: Sitting    Cuff Size: Large Adult    Pulse: 88    Temp: 97.8 °F (36.6 °C)    TempSrc: Temporal    SpO2: 98%    Weight: 121 kg (266 lb 6.4 oz)    Height: 193 cm (76\")         Wt Readings from Last 3 Encounters:   04/08/25 121 kg (266 lb 6.4 oz)   12/18/24 123 kg (271 lb)   12/10/24 119 kg (262 lb 5.6 oz)        BP Readings from Last 3 Encounters:   04/08/25 100/80   10/08/24 130/90   04/30/24 137/79        Physical Exam  Vital Signs  BMI is 32.43.    Physical Exam  Vitals reviewed.   Constitutional:       General: He is not in acute distress.  HENT:      Head: Normocephalic and atraumatic.   Eyes:      Conjunctiva/sclera: Conjunctivae normal.   Cardiovascular:      Rate and Rhythm: Normal rate and regular rhythm.      Heart sounds: Normal heart sounds.   Pulmonary:      Effort: Pulmonary effort is normal.      Breath sounds: Normal breath sounds. No wheezing, rhonchi or rales.   Abdominal:      General: There is no distension.      Palpations: Abdomen is soft. There is no mass.      Tenderness: There is no abdominal tenderness.   Musculoskeletal:      Right lower leg: No edema.      Left lower leg: No edema.   Lymphadenopathy:      Cervical: No cervical adenopathy.   Skin:     General: Skin is warm and dry.   Neurological:      General: No focal deficit present.      Mental Status: He is alert.   Psychiatric:         Mood and Affect: Mood normal.         Thought Content: Thought content normal.                Result Review   The following data was reviewed by: APPLE Madrid on 03/17/2025:  [x]  Tests & " Results  []  Hospitalization/Emergency Department/Urgent Care  [x]  Internal/External Consultant Notes       Assessment and Plan     Diagnoses and all orders for this visit:    1. Annual physical exam (Primary)  -     CBC w AUTO Differential; Future  -     Comprehensive metabolic panel; Future  -     Lipid panel; Future  -     TSH+Free T4; Future  -     Cancel: QuantiFERON TB Gold; Future  -     Comprehensive Metabolic Panel; Future  -     CBC & Differential; Future  -     Lipid Panel; Future  -     TSH Rfx On Abnormal To Free T4; Future  -     CBC w AUTO Differential  -     Comprehensive metabolic panel  -     Lipid panel  -     TSH+Free T4  -     Cancel: Comprehensive Metabolic Panel  -     Cancel: CBC & Differential  -     Cancel: Lipid Panel  -     TSH Rfx On Abnormal To Free T4    2. Elevated PSA    3. Chronic gout of multiple sites, unspecified cause    4. History of herpes simplex infection    5. Umbilical hernia without obstruction and without gangrene    6. Prediabetes  -     Hemoglobin A1c; Future  -     Hemoglobin A1c; Future  -     Hemoglobin A1c  -     Cancel: Hemoglobin A1c    7. Vitamin D deficiency  -     Vitamin D,25-Hydroxy; Future  -     Vitamin D,25-Hydroxy; Future  -     Vitamin D,25-Hydroxy  -     Cancel: Vitamin D,25-Hydroxy    8. High blood triglycerides    9. Frequent urination    10. Elevated blood pressure reading    11. Fatigue, unspecified type  -     Prolactin; Future  -     FSH & LH; Future  -     Sex Horm Binding Globulin; Future  -     Testosterone; Future  -     Prolactin  -     FSH & LH  -     Sex Horm Binding Globulin  -     Testosterone    12. Class 1 obesity due to excess calories with body mass index (BMI) of 32.0 to 32.9 in adult, unspecified whether serious comorbidity present    Other orders  -     allopurinol (ZYLOPRIM) 300 MG tablet; Take 1 tablet by mouth Daily.  Dispense: 90 tablet; Refill: 3  -     valACYclovir (VALTREX) 500 MG tablet; Take 1 tablet by mouth Daily.   Dispense: 90 tablet; Refill: 3  -     Semaglutide-Weight Management 0.25 MG/0.5ML solution auto-injector; Inject 0.05 mL under the skin into the appropriate area as directed 1 (One) Time Per Week. After 4 weeks increase to the next dose.  Dispense: 2 mL; Refill: 0  -     vitamin D (ERGOCALCIFEROL) 1.25 MG (15077 UT) capsule capsule; Take 1 capsule only once per week with a fatty meal.  Dispense: 13 capsule; Refill: 3           Assessment & Plan  1. Annual wellness examination.  His blood pressure readings are marginally elevated today. His BMI is 32.43. His last recorded A1c level was 6.1, indicating prediabetes. His most recent vitamin D level, assessed in October 2024, was low. His lipid panel revealed elevated triglycerides and slightly reduced HDL levels, while his total cholesterol and LDL levels were within normal limits. He will continue his current regimen of vitamin D supplementation. A comprehensive set of wellness labs will be ordered today, including tests for testosterone and vitamin D levels.    2. Elevated PSA.  He is currently under the care of a urologist for this condition and has an appointment scheduled in 1 month. He reported that his PSA levels decreased after taking diclofenac, which he used for knee pain. He will continue to monitor his PSA levels with his urologist.    3. Gout.  He is currently taking allopurinol for gout management. He will continue this medication as prescribed.    4. History of herpes.  He is taking valacyclovir intermittently to prevent cold sores. He will continue this medication as needed.    5. Umbilical hernia.  He experiences occasional nausea and discomfort from the hernia, which he can manually reduce. He is advised to consider surgical repair if the hernia becomes more bothersome or painful.    6. Prediabetes.  His last A1c was 6.1, indicating prediabetes. He will continue to monitor his blood sugar levels. A new prescription for Wegovy (semaglutide) will be  considered, as it may be covered by his current insurance.    7. Vitamin D deficiency.  His last vitamin D level was low. He will continue taking vitamin D supplements and prefers a prescription over-the-counter option.    8. Elevated triglycerides.  His last lipid panel showed elevated triglycerides. He is advised to continue dietary modifications and increase physical activity to improve his lipid profile.    9. Frequent urination.  He reported frequent urination and a sensation at the tip of his urethra. Previous tests for UTI and STD were negative. He is currently taking a course of Bactrim for a suspected UTI but did not find significant relief. He will discuss these symptoms further with his urologist.    10.  Elevated blood pressure.  Upon reassessment of blood pressure 100/80.    11.  Fatigue.  A full panel of labs including testosterone level was ordered for the patient.  He will follow-up post labs as needed.    12.  Obesity.  The patient wishes to start on Wegovy injections.  He has been on these before.  He meets the criteria with a BMI of 32.43 and history of prediabetes and elevated triglycerides.    Patient was given instructions and counseling regarding his condition or for health maintenance advice. Please see specific information pulled into the AVS if appropriate.     Follow Up   Return in about 1 year (around 4/8/2026) for Annual physical.    Patient or patient representative verbalized consent for the use of Ambient Listening during the visit with  APPLE Madrid for chart documentation. 4/8/2025  09:56 EDT    APPLE Madrid

## 2025-04-08 ENCOUNTER — OFFICE VISIT (OUTPATIENT)
Dept: INTERNAL MEDICINE | Age: 60
End: 2025-04-08
Payer: COMMERCIAL

## 2025-04-08 VITALS
DIASTOLIC BLOOD PRESSURE: 80 MMHG | HEIGHT: 76 IN | BODY MASS INDEX: 32.44 KG/M2 | SYSTOLIC BLOOD PRESSURE: 100 MMHG | TEMPERATURE: 97.8 F | OXYGEN SATURATION: 98 % | HEART RATE: 88 BPM | WEIGHT: 266.4 LBS

## 2025-04-08 DIAGNOSIS — Z86.19 HISTORY OF HERPES SIMPLEX INFECTION: ICD-10-CM

## 2025-04-08 DIAGNOSIS — R53.83 FATIGUE, UNSPECIFIED TYPE: ICD-10-CM

## 2025-04-08 DIAGNOSIS — E66.09 CLASS 1 OBESITY DUE TO EXCESS CALORIES WITH BODY MASS INDEX (BMI) OF 32.0 TO 32.9 IN ADULT, UNSPECIFIED WHETHER SERIOUS COMORBIDITY PRESENT: ICD-10-CM

## 2025-04-08 DIAGNOSIS — E66.811 CLASS 1 OBESITY DUE TO EXCESS CALORIES WITH BODY MASS INDEX (BMI) OF 32.0 TO 32.9 IN ADULT, UNSPECIFIED WHETHER SERIOUS COMORBIDITY PRESENT: ICD-10-CM

## 2025-04-08 DIAGNOSIS — R35.0 FREQUENT URINATION: ICD-10-CM

## 2025-04-08 DIAGNOSIS — R97.20 ELEVATED PSA: ICD-10-CM

## 2025-04-08 DIAGNOSIS — Z00.00 ANNUAL PHYSICAL EXAM: Primary | ICD-10-CM

## 2025-04-08 DIAGNOSIS — R73.03 PREDIABETES: ICD-10-CM

## 2025-04-08 DIAGNOSIS — R03.0 ELEVATED BLOOD PRESSURE READING: ICD-10-CM

## 2025-04-08 DIAGNOSIS — K42.9 UMBILICAL HERNIA WITHOUT OBSTRUCTION AND WITHOUT GANGRENE: ICD-10-CM

## 2025-04-08 DIAGNOSIS — E78.1 HIGH BLOOD TRIGLYCERIDES: ICD-10-CM

## 2025-04-08 DIAGNOSIS — M1A.09X0 CHRONIC GOUT OF MULTIPLE SITES, UNSPECIFIED CAUSE: ICD-10-CM

## 2025-04-08 DIAGNOSIS — E55.9 VITAMIN D DEFICIENCY: ICD-10-CM

## 2025-04-08 PROBLEM — N52.9 ERECTILE DYSFUNCTION: Status: ACTIVE | Noted: 2025-04-08

## 2025-04-08 LAB
25(OH)D3 SERPL-MCNC: 50.9 NG/ML (ref 30–100)
ALBUMIN SERPL-MCNC: 4.3 G/DL (ref 3.5–5.2)
ALBUMIN/GLOB SERPL: 1.7 G/DL
ALP SERPL-CCNC: 60 U/L (ref 39–117)
ALT SERPL W P-5'-P-CCNC: 33 U/L (ref 1–41)
ANION GAP SERPL CALCULATED.3IONS-SCNC: 11.4 MMOL/L (ref 5–15)
AST SERPL-CCNC: 22 U/L (ref 1–40)
BASOPHILS # BLD AUTO: 0.08 10*3/MM3 (ref 0–0.2)
BASOPHILS NFR BLD AUTO: 0.9 % (ref 0–1.5)
BILIRUB SERPL-MCNC: 0.3 MG/DL (ref 0–1.2)
BUN SERPL-MCNC: 17 MG/DL (ref 6–20)
BUN/CREAT SERPL: 13.5 (ref 7–25)
CALCIUM SPEC-SCNC: 9.4 MG/DL (ref 8.6–10.5)
CHLORIDE SERPL-SCNC: 104 MMOL/L (ref 98–107)
CHOLEST SERPL-MCNC: 144 MG/DL (ref 0–200)
CO2 SERPL-SCNC: 25.6 MMOL/L (ref 22–29)
CREAT SERPL-MCNC: 1.26 MG/DL (ref 0.76–1.27)
DEPRECATED RDW RBC AUTO: 42 FL (ref 37–54)
EGFRCR SERPLBLD CKD-EPI 2021: 66.5 ML/MIN/1.73
EOSINOPHIL # BLD AUTO: 0.13 10*3/MM3 (ref 0–0.4)
EOSINOPHIL NFR BLD AUTO: 1.5 % (ref 0.3–6.2)
ERYTHROCYTE [DISTWIDTH] IN BLOOD BY AUTOMATED COUNT: 13.4 % (ref 12.3–15.4)
FSH SERPL-ACNC: 4.38 MIU/ML
GLOBULIN UR ELPH-MCNC: 2.6 GM/DL
GLUCOSE SERPL-MCNC: 102 MG/DL (ref 65–99)
HBA1C MFR BLD: 5.8 % (ref 4.8–5.6)
HCT VFR BLD AUTO: 48.8 % (ref 37.5–51)
HDLC SERPL-MCNC: 37 MG/DL (ref 40–60)
HGB BLD-MCNC: 16 G/DL (ref 13–17.7)
IMM GRANULOCYTES # BLD AUTO: 0.05 10*3/MM3 (ref 0–0.05)
IMM GRANULOCYTES NFR BLD AUTO: 0.6 % (ref 0–0.5)
LDLC SERPL CALC-MCNC: 88 MG/DL (ref 0–100)
LDLC/HDLC SERPL: 2.35 {RATIO}
LH SERPL-ACNC: 4.09 MIU/ML
LYMPHOCYTES # BLD AUTO: 1.75 10*3/MM3 (ref 0.7–3.1)
LYMPHOCYTES NFR BLD AUTO: 19.6 % (ref 19.6–45.3)
MCH RBC QN AUTO: 28.7 PG (ref 26.6–33)
MCHC RBC AUTO-ENTMCNC: 32.8 G/DL (ref 31.5–35.7)
MCV RBC AUTO: 87.5 FL (ref 79–97)
MONOCYTES # BLD AUTO: 0.62 10*3/MM3 (ref 0.1–0.9)
MONOCYTES NFR BLD AUTO: 7 % (ref 5–12)
NEUTROPHILS NFR BLD AUTO: 6.28 10*3/MM3 (ref 1.7–7)
NEUTROPHILS NFR BLD AUTO: 70.4 % (ref 42.7–76)
NRBC BLD AUTO-RTO: 0 /100 WBC (ref 0–0.2)
PLATELET # BLD AUTO: 286 10*3/MM3 (ref 140–450)
PMV BLD AUTO: 10.7 FL (ref 6–12)
POTASSIUM SERPL-SCNC: 5.3 MMOL/L (ref 3.5–5.2)
PROLACTIN SERPL-MCNC: 6.79 NG/ML (ref 4.04–15.2)
PROT SERPL-MCNC: 6.9 G/DL (ref 6–8.5)
RBC # BLD AUTO: 5.58 10*6/MM3 (ref 4.14–5.8)
SODIUM SERPL-SCNC: 141 MMOL/L (ref 136–145)
T4 FREE SERPL-MCNC: 1.2 NG/DL (ref 0.92–1.68)
TESTOST SERPL-MCNC: 529 NG/DL (ref 193–740)
TRIGL SERPL-MCNC: 100 MG/DL (ref 0–150)
TSH SERPL DL<=0.05 MIU/L-ACNC: 2.02 UIU/ML (ref 0.27–4.2)
TSH SERPL DL<=0.05 MIU/L-ACNC: 2.02 UIU/ML (ref 0.27–4.2)
VLDLC SERPL-MCNC: 19 MG/DL (ref 5–40)
WBC NRBC COR # BLD AUTO: 8.91 10*3/MM3 (ref 3.4–10.8)

## 2025-04-08 PROCEDURE — 83036 HEMOGLOBIN GLYCOSYLATED A1C: CPT | Performed by: NURSE PRACTITIONER

## 2025-04-08 PROCEDURE — 84270 ASSAY OF SEX HORMONE GLOBUL: CPT | Performed by: NURSE PRACTITIONER

## 2025-04-08 PROCEDURE — 82306 VITAMIN D 25 HYDROXY: CPT | Performed by: NURSE PRACTITIONER

## 2025-04-08 PROCEDURE — 84439 ASSAY OF FREE THYROXINE: CPT | Performed by: NURSE PRACTITIONER

## 2025-04-08 PROCEDURE — 83002 ASSAY OF GONADOTROPIN (LH): CPT | Performed by: NURSE PRACTITIONER

## 2025-04-08 PROCEDURE — 84403 ASSAY OF TOTAL TESTOSTERONE: CPT | Performed by: NURSE PRACTITIONER

## 2025-04-08 PROCEDURE — 84443 ASSAY THYROID STIM HORMONE: CPT | Performed by: NURSE PRACTITIONER

## 2025-04-08 PROCEDURE — 83001 ASSAY OF GONADOTROPIN (FSH): CPT | Performed by: NURSE PRACTITIONER

## 2025-04-08 PROCEDURE — 80061 LIPID PANEL: CPT | Performed by: NURSE PRACTITIONER

## 2025-04-08 PROCEDURE — 80050 GENERAL HEALTH PANEL: CPT | Performed by: NURSE PRACTITIONER

## 2025-04-08 PROCEDURE — 84146 ASSAY OF PROLACTIN: CPT | Performed by: NURSE PRACTITIONER

## 2025-04-08 RX ORDER — ERGOCALCIFEROL 1.25 MG/1
CAPSULE, LIQUID FILLED ORAL
Qty: 13 CAPSULE | Refills: 3 | Status: SHIPPED | OUTPATIENT
Start: 2025-04-08

## 2025-04-08 RX ORDER — TAMSULOSIN HYDROCHLORIDE 0.4 MG/1
2 CAPSULE ORAL DAILY
COMMUNITY
Start: 2025-01-10

## 2025-04-08 RX ORDER — ALLOPURINOL 300 MG/1
300 TABLET ORAL DAILY
Qty: 90 TABLET | Refills: 3 | Status: SHIPPED | OUTPATIENT
Start: 2025-04-08

## 2025-04-08 RX ORDER — VALACYCLOVIR HYDROCHLORIDE 500 MG/1
500 TABLET, FILM COATED ORAL DAILY
Qty: 90 TABLET | Refills: 3 | Status: SHIPPED | OUTPATIENT
Start: 2025-04-08

## 2025-04-09 LAB — SHBG SERPL-SCNC: 21.6 NMOL/L (ref 19.3–76.4)

## 2025-04-11 ENCOUNTER — CLINICAL SUPPORT (OUTPATIENT)
Dept: INTERNAL MEDICINE | Age: 60
End: 2025-04-11
Payer: COMMERCIAL

## 2025-04-11 DIAGNOSIS — I10 ESSENTIAL HYPERTENSION: ICD-10-CM

## 2025-04-11 LAB
ANION GAP SERPL CALCULATED.3IONS-SCNC: 10.4 MMOL/L (ref 5–15)
BUN SERPL-MCNC: 18 MG/DL (ref 6–20)
BUN/CREAT SERPL: 15 (ref 7–25)
CALCIUM SPEC-SCNC: 9.1 MG/DL (ref 8.6–10.5)
CHLORIDE SERPL-SCNC: 100 MMOL/L (ref 98–107)
CO2 SERPL-SCNC: 28.6 MMOL/L (ref 22–29)
CREAT SERPL-MCNC: 1.2 MG/DL (ref 0.76–1.27)
EGFRCR SERPLBLD CKD-EPI 2021: 70.5 ML/MIN/1.73
GLUCOSE SERPL-MCNC: 90 MG/DL (ref 65–99)
POTASSIUM SERPL-SCNC: 4.8 MMOL/L (ref 3.5–5.2)
SODIUM SERPL-SCNC: 139 MMOL/L (ref 136–145)

## 2025-04-11 PROCEDURE — 36415 COLL VENOUS BLD VENIPUNCTURE: CPT | Performed by: NURSE PRACTITIONER

## 2025-04-11 PROCEDURE — 80048 BASIC METABOLIC PNL TOTAL CA: CPT | Performed by: NURSE PRACTITIONER

## 2025-05-06 ENCOUNTER — TELEPHONE (OUTPATIENT)
Dept: UROLOGY | Age: 60
End: 2025-05-06
Payer: COMMERCIAL

## 2025-05-06 DIAGNOSIS — N40.1 BENIGN PROSTATIC HYPERPLASIA (BPH) WITH POST-VOID DRIBBLING: Primary | ICD-10-CM

## 2025-05-06 DIAGNOSIS — N39.43 BENIGN PROSTATIC HYPERPLASIA (BPH) WITH POST-VOID DRIBBLING: Primary | ICD-10-CM

## 2025-05-06 RX ORDER — TAMSULOSIN HYDROCHLORIDE 0.4 MG/1
2 CAPSULE ORAL DAILY
Qty: 60 CAPSULE | Refills: 0 | Status: SHIPPED | OUTPATIENT
Start: 2025-05-06

## 2025-05-06 NOTE — TELEPHONE ENCOUNTER
PATIENT CALLED AND SAID HE THINKS HE THREW OUT HIS TAMSULOSIN WHEN HE WAS TAKING THE PAPERS OUT OF THE BAG.  HE HASN'T HAD ANY FOR 2 WEEKS.    HE TAKES 2 DAILY AND GETS A QUANTITY OF 60 A MONTH.  HE CAN ONLY GET 30 DAYS AT A TIME, DUE TO SOMETHING WITH HIS INSURANCE.    [facility]    #[phone]

## 2025-05-15 ENCOUNTER — TELEPHONE (OUTPATIENT)
Dept: UROLOGY | Age: 60
End: 2025-05-15
Payer: COMMERCIAL

## 2025-06-10 ENCOUNTER — TELEPHONE (OUTPATIENT)
Dept: UROLOGY | Age: 60
End: 2025-06-10
Payer: COMMERCIAL

## 2025-06-10 DIAGNOSIS — N40.1 BENIGN PROSTATIC HYPERPLASIA (BPH) WITH POST-VOID DRIBBLING: ICD-10-CM

## 2025-06-10 DIAGNOSIS — N39.43 BENIGN PROSTATIC HYPERPLASIA (BPH) WITH POST-VOID DRIBBLING: ICD-10-CM

## 2025-06-10 RX ORDER — TAMSULOSIN HYDROCHLORIDE 0.4 MG/1
2 CAPSULE ORAL DAILY
Qty: 60 CAPSULE | Refills: 0 | OUTPATIENT
Start: 2025-06-10

## 2025-06-12 DIAGNOSIS — N39.43 BENIGN PROSTATIC HYPERPLASIA (BPH) WITH POST-VOID DRIBBLING: ICD-10-CM

## 2025-06-12 DIAGNOSIS — N40.1 BENIGN PROSTATIC HYPERPLASIA (BPH) WITH POST-VOID DRIBBLING: ICD-10-CM

## 2025-06-12 RX ORDER — TAMSULOSIN HYDROCHLORIDE 0.4 MG/1
2 CAPSULE ORAL DAILY
Qty: 60 CAPSULE | Refills: 0 | Status: SHIPPED | OUTPATIENT
Start: 2025-06-12

## 2025-06-12 NOTE — TELEPHONE ENCOUNTER
PATIENT CALLED AND SAID HE IS OUT OF REFILLS ON TAMSULOSIN, AND THAT HE HAS BEEN ON IT 2 OR 3 YEARS.  HE ASKED FOR REFILLS.    I TOLD HIM THAT MELISSA JARRELL'S NURSE, SENT HIM A MESSAGE IN MY CHART REGARDING THIS ON 06/10/25.  I ASKED HIM IF HE HAD SEEN IT, AND HE HAD NOT.  I READ THE MESSAGE TO HIM, PER WESLY.    PATIENT SAID THAT HE HAS HIS NEW INSURANCE FROM [employer].  HE DID NOT HAVE HIS CARD WITH HIM.  HE SAID HE WILL GET HIS CARD AND CALL US BACK.    #[phone]

## 2025-06-12 NOTE — TELEPHONE ENCOUNTER
Caller: Alli Brower    Relationship: Self    Best call back number: [phone]     Requested Prescriptions:   Requested Prescriptions     Pending Prescriptions Disp Refills    tamsulosin (FLOMAX) 0.4 MG capsule 24 hr capsule 60 capsule 0     Sig: Take 2 capsules by mouth Daily.        Pharmacy where request should be sent: [id number] [place] [facility] [address] [facility] [phone] [facility] [phone] FX     Last office visit with prescribing clinician: 4/8/2025   Last telemedicine visit with prescribing clinician: Visit date not found   Next office visit with prescribing clinician: 4/10/2026     Additional details provided by patient: STATED THAT HE HAS 2 DOSES OF THE MEDICATION REMAINING AND THAT DENVER MILLER AND MAGGIE MENDOZA HAVE PROVIDED THIS MEDICATION IN THE PAST     Does the patient have less than a 3 day supply:  [x] Yes  [] No    Ce Moya   06/12/25 15:12 EDT

## 2025-07-09 DIAGNOSIS — N39.43 BENIGN PROSTATIC HYPERPLASIA (BPH) WITH POST-VOID DRIBBLING: ICD-10-CM

## 2025-07-09 DIAGNOSIS — N40.1 BENIGN PROSTATIC HYPERPLASIA (BPH) WITH POST-VOID DRIBBLING: ICD-10-CM

## 2025-07-09 RX ORDER — TAMSULOSIN HYDROCHLORIDE 0.4 MG/1
2 CAPSULE ORAL DAILY
Qty: 60 CAPSULE | Refills: 0 | Status: SHIPPED | OUTPATIENT
Start: 2025-07-09

## 2025-08-06 DIAGNOSIS — E66.9 OBESITY (BMI 35.0-39.9 WITHOUT COMORBIDITY): Primary | ICD-10-CM

## 2025-08-06 DIAGNOSIS — R73.03 PREDIABETES: ICD-10-CM

## 2025-08-14 DIAGNOSIS — R97.20 ELEVATED PROSTATE SPECIFIC ANTIGEN (PSA): Primary | ICD-10-CM

## 2025-08-20 DIAGNOSIS — N40.1 BENIGN PROSTATIC HYPERPLASIA (BPH) WITH POST-VOID DRIBBLING: ICD-10-CM

## 2025-08-20 DIAGNOSIS — N39.43 BENIGN PROSTATIC HYPERPLASIA (BPH) WITH POST-VOID DRIBBLING: ICD-10-CM

## 2025-08-20 RX ORDER — TAMSULOSIN HYDROCHLORIDE 0.4 MG/1
2 CAPSULE ORAL DAILY
Qty: 60 CAPSULE | Refills: 3 | Status: SHIPPED | OUTPATIENT
Start: 2025-08-20